# Patient Record
Sex: MALE | Race: WHITE | NOT HISPANIC OR LATINO
[De-identification: names, ages, dates, MRNs, and addresses within clinical notes are randomized per-mention and may not be internally consistent; named-entity substitution may affect disease eponyms.]

---

## 2021-08-27 ENCOUNTER — NON-APPOINTMENT (OUTPATIENT)
Age: 71
End: 2021-08-27

## 2021-10-11 PROBLEM — Z00.00 ENCOUNTER FOR PREVENTIVE HEALTH EXAMINATION: Status: ACTIVE | Noted: 2021-10-11

## 2021-10-13 ENCOUNTER — APPOINTMENT (OUTPATIENT)
Dept: GASTROENTEROLOGY | Facility: CLINIC | Age: 71
End: 2021-10-13

## 2022-01-19 ENCOUNTER — APPOINTMENT (OUTPATIENT)
Dept: GASTROENTEROLOGY | Facility: CLINIC | Age: 72
End: 2022-01-19

## 2022-05-18 ENCOUNTER — APPOINTMENT (OUTPATIENT)
Dept: GASTROENTEROLOGY | Facility: CLINIC | Age: 72
End: 2022-05-18
Payer: MEDICARE

## 2022-05-18 VITALS
HEART RATE: 60 BPM | DIASTOLIC BLOOD PRESSURE: 60 MMHG | HEIGHT: 66 IN | SYSTOLIC BLOOD PRESSURE: 122 MMHG | WEIGHT: 124 LBS | TEMPERATURE: 97 F | BODY MASS INDEX: 19.93 KG/M2

## 2022-05-18 DIAGNOSIS — Z86.010 PERSONAL HISTORY OF COLONIC POLYPS: ICD-10-CM

## 2022-05-18 DIAGNOSIS — R63.4 ABNORMAL WEIGHT LOSS: ICD-10-CM

## 2022-05-18 DIAGNOSIS — C85.90 NON-HODGKIN LYMPHOMA, UNSPECIFIED, UNSPECIFIED SITE: ICD-10-CM

## 2022-05-18 DIAGNOSIS — Z78.9 OTHER SPECIFIED HEALTH STATUS: ICD-10-CM

## 2022-05-18 PROCEDURE — 99204 OFFICE O/P NEW MOD 45 MIN: CPT

## 2022-05-18 RX ORDER — LITHIUM CARBONATE 300 MG/1
300 TABLET ORAL
Refills: 0 | Status: ACTIVE | COMMUNITY

## 2022-05-18 RX ORDER — BACLOFEN 10 MG/1
10 TABLET ORAL 3 TIMES DAILY
Refills: 0 | Status: ACTIVE | COMMUNITY

## 2022-05-18 RX ORDER — CLONAZEPAM 2 MG/1
TABLET ORAL 3 TIMES DAILY
Refills: 0 | Status: ACTIVE | COMMUNITY

## 2022-05-18 RX ORDER — QUETIAPINE 300 MG/1
300 TABLET, FILM COATED ORAL
Refills: 0 | Status: ACTIVE | COMMUNITY

## 2022-05-18 NOTE — ASSESSMENT
[FreeTextEntry1] : -wt loss - hx NHL - will get CT c/a/p.  If neg, then EGD/colon.\par \par -hx polyps - Colon cancer screening - colonoscopy due - will plan w/ EGD after CT done.\par \par PMD/consultation/hospital notes and Labs/imaging/prior endoscopic results reviewed to extent noted in HPI; and, if procedure code billed on this visit for lab draw, this serves to signify that labs were drawn here in this office.\par

## 2022-05-18 NOTE — HISTORY OF PRESENT ILLNESS
[FreeTextEntry1] : 72m NHL, tardive dyskinesia, bipolar d/o, hx polyps, presenting for colon cancer screening. Pt denies abdominal pain, rectal bleeding, change in bowel habits.  He does have 20lb wt loss in last 1-2y.  No abd pain/night sweats/fevers.  Was apparently d/c from heme f/u after long term f/u.  No dysphagia/odynophagia/choking/pna. \par \par Diarrhea w/u 2011 w/ me:\par 6/2011 colonoscopy - diverticulosis, hemorrhoids, nonspec. microscopic infl. change on random bx - repeat 10y; stool studies neg \par 5/2017 colonoscopy - polyps, hemorrhoids, 3y f/u\par \par Soc:  no tobacco or significant EtOH\par FHx: no FHx GI malignancy or IBD\par \par ROS:\par Constitutional:: no weight loss, fevers\par ENT: no deafness\par Eyes: not blind\par Neck: no LN\par Chest: no dyspnea/cough\par Cardiac: no chest pain\par Vascular: no leg swelling\par GI: no abdominal pain, nausea, vomiting, diarrhea, constipation, rectal bleeding, dysphagia, melena unless otherwise noted in HPI\par : no dysuria, dark urine\par Skin: no rashes, jaundice\par Heme: no bleeding\par Endocrine: no DM unless otherwise stated in HPI\par \par Px: (VS noted below)\par General: NAD\par Eyes: anicteric\par Oropharynx:  clear; poor dentition, protruding tongue/baseline.\par Neck: no LN\par Chest: normal respiratory effort\par CVS: regular\par Abd: soft, NT, ND, +BS, no HSM\par Ext: no atrophy\par Neuro: grossly nonfocal\par \par Labs/imaging/prior endoscopic results reviewed to the extent available and noted in HPI\par

## 2022-05-18 NOTE — REASON FOR VISIT
[Consultation] : a consultation visit [FreeTextEntry1] : Kindly asked byDr. Yepez  to consult and evaluate patient for  colon screening, wt loss                  \par A copy of this note is being sent to physician requesting consultation.

## 2022-05-18 NOTE — CONSULT LETTER
[FreeTextEntry1] : Dear Dr. Yepez ,\par \par I had the pleasure of evaluating your patient,  SARAH MA.\par \par Please refer to my note below.\par \par Thank you very much for allowing me to participate in the care of this patient.  If you have any questions, please do not hesitate to contact me.\par \par Sincerely, \par \par Timbo White MD\par

## 2022-07-08 ENCOUNTER — RESULT REVIEW (OUTPATIENT)
Age: 72
End: 2022-07-08

## 2022-07-09 ENCOUNTER — RESULT REVIEW (OUTPATIENT)
Age: 72
End: 2022-07-09

## 2022-07-27 ENCOUNTER — TRANSCRIPTION ENCOUNTER (OUTPATIENT)
Age: 72
End: 2022-07-27

## 2023-06-09 ENCOUNTER — OUTPATIENT (OUTPATIENT)
Dept: OUTPATIENT SERVICES | Facility: HOSPITAL | Age: 73
LOS: 1 days | End: 2023-06-09
Payer: MEDICARE

## 2023-06-09 ENCOUNTER — NON-APPOINTMENT (OUTPATIENT)
Age: 73
End: 2023-06-09

## 2023-06-09 ENCOUNTER — OUTPATIENT (OUTPATIENT)
Dept: OUTPATIENT SERVICES | Facility: HOSPITAL | Age: 73
LOS: 1 days | Discharge: HOME | End: 2023-06-09

## 2023-06-09 ENCOUNTER — APPOINTMENT (OUTPATIENT)
Dept: RADIOLOGY | Facility: HOSPITAL | Age: 73
End: 2023-06-09

## 2023-06-09 DIAGNOSIS — R10.13 EPIGASTRIC PAIN: ICD-10-CM

## 2023-06-09 PROCEDURE — 74230 X-RAY XM SWLNG FUNCJ C+: CPT | Mod: 26

## 2023-06-09 NOTE — ASSESSMENT
[FreeTextEntry1] : MODIFIED BARIUM SWALLOW STUDY \par \par Date of Report: 6/9/23 \par Date of Evaluation: 6/9/23 \par Patient Name: Robby Christie \par YOB: 1950 \par Primary Diagnosis: OroPharyngeal Dysphagia \par Treatment Diagnosis: OroPharyngeal Dysphagia \par Referring Physician: Dr. Lemuel Forbes \par \par Impressions/Results:  \par 1. There is Trace Laryngeal Penetration during the swallow without retrieval leaving Trace residue in the laryngeal vestibule for thin liquids  \par 2. No Aspiration observed before, during or after the swallow for puree, minced and moist solids and/or mildly thick liquids. \par \par Reason For Referral: This 73 year old male was seen for a Modified Barium Swallow to: rule out aspiration and assess for safest diet consistency, to determine patient's ability to safely tolerate an oral diet. The physician ordered this procedure because they want the patient to meet their nutrition/hydration needs by mouth without compromising respiratory status. \par \par Patient arrived to today’s evaluation from [facility]. Patient is able to communicate wants/needs and follows directives. Patient reporting he is having difficulty on his current diet of ground soft/minced and moist texture, characterized by difficulty chewing with patient stating “I only have 4 teeth” (4 bottom dentition noted). Patient states he had top dentures, however, he would “choke” on them, therefore, he does not use them. Patient denies difficulty with liquids. Patient further denies choking episodes with foods/liquids. \par \par Medical History: Patient presents with medical history of the following per [facility] Documentation: \par Schizoaffective Disorder, Bipolar type \par Delirium Due to Known Physiological Condition \par Idiopathic Orofacial Dystonia \par Extrapyramidal and Movement Disorder \par Insomnia \par Acute Kidney Failure \par Dysphagia \par Essential (primary), Hypertension \par Retention of Urine \par Benign Prostatic Hyperplasia with Lower Urinary Tract Symptoms \par Peripheral Vascular Disease  \par \par ASSESSMENT \par The patient was assessed standing in the lateral plane in the [facility]iology Suite, with Radiologist present. The patient was alert, cooperative. Secretion management was adequate. There was no coughing, throat clearing or wet/gurgly vocal quality prior to test administration. \par \par Consistencies Administered: \par Solids: Puree, Minced and Moist solids \par Liquids: Mildly thick liquids and Thin liquids \par \par SUMMARY & IMPRESSION \par The patient demonstrated the following: \par 1. Mild-moderate oral dysphagia for puree, minced and moist solids, mildly thick liquids and thin liquids marked by reduced labial seal closure around teaspoon utensil resulting in reduced retrieval from teaspoon, slow/prolonged gumming/mastication for minced and moist solids with slow/delayed tongue motion for anterior to posterior transfer (minced and moist solids>puree). There is premature spillage to the hypopharynx due to reduced tongue to palate seal for mildly thick and thin liquids. Mild oral residue located primarily on the tongue surface for puree/minced and moist solids that would clear with spontaneous re-collection and re-swallow. \par 2. Mild pharyngeal dysphagia for puree, minced and moist solids, mildly thick liquids and thin liquids marked by a delayed pharyngeal swallow trigger (bolus head at the vallecula for thin liquids) with reduced base of tongue retraction, reduced hyolaryngeal elevation, reduced epiglottic deflection, reduced pharyngeal contractility and incomplete closure of the laryngeal vestibule. There was mild pharyngeal residue post primary swallow located primarily in the pyriform for puree. A thin liquid wash assists with pharyngal clearance. There is Trace Laryngeal Penetration during the swallow without retrieval leaving Trace residue in the laryngeal vestibule for thin liquids. No Aspiration observed before, during or after the swallow for puree, minced and moist solids and/or mildly thick liquids. \par \par Aspiration - Penetration Scale: \par PUREE: 1 \par MINCED AND MOIST SOLIDS: 1 \par MILDLY THICK LIQUIDS: 1 \par THIN LIQUIDS: 3 \par \par Aspiration - Penetration Scale (Lesterbek et al Dysphagia 11:93-98 (April 1996), Aspiration-Penetration Scale) \par 1. Material does not enter the airway \par 2. Material enters the airway, remains above the vocal folds, and is ejected from the airway \par 3. Material enters the airway, remains above the vocal folds, and is not ejected \par 4. Material enters the airway, contacts the vocal folds, and is ejected from the airway \par 5. Material enters the airway, contacts the vocal folds, and is not ejected from the airway \par 6. Material enters the airway, passes below the vocal folds and is ejected into the larynx or out of the airway \par 7. Material enters the airway, passes below the vocal folds, and is not ejected from the trachea despite effort \par 8. Material enters the airway, passes below the vocal folds, and no effort is made to eject \par \par Recommendations: \par 1) Puree with Mildly thick liquids \par 2) Feeding/swallowing guidelines: Upright positioning, Alternate puree and mildly thick liquids \par 3) Aspiration/Reflux precautions \par 4) Consider Swallowing Therapy at MD’s discretion to maximize swallow mechanism \par 5) Follow up with referring physician \par \par The above results and recommendations have been discussed with the patient. All questions were answered with patient demonstrating good understanding. \par  \par Should you have any additional concerns, please contact the Center at [phone]. \par  \par Fouzia Arias MS, CCC-SLP \par Speech-Language Pathologist \par [facility]

## 2023-06-21 DIAGNOSIS — R13.12 DYSPHAGIA, OROPHARYNGEAL PHASE: ICD-10-CM

## 2023-10-13 ENCOUNTER — EMERGENCY (EMERGENCY)
Facility: HOSPITAL | Age: 73
LOS: 1 days | Discharge: HOME | End: 2023-10-13
Attending: EMERGENCY MEDICINE | Admitting: EMERGENCY MEDICINE
Payer: MEDICAID

## 2023-10-13 VITALS
OXYGEN SATURATION: 99 % | RESPIRATION RATE: 18 BRPM | DIASTOLIC BLOOD PRESSURE: 79 MMHG | HEART RATE: 70 BPM | SYSTOLIC BLOOD PRESSURE: 125 MMHG | TEMPERATURE: 98 F

## 2023-10-13 VITALS
DIASTOLIC BLOOD PRESSURE: 84 MMHG | SYSTOLIC BLOOD PRESSURE: 151 MMHG | OXYGEN SATURATION: 99 % | HEART RATE: 87 BPM | TEMPERATURE: 98 F | RESPIRATION RATE: 18 BRPM

## 2023-10-13 PROCEDURE — 99283 EMERGENCY DEPT VISIT LOW MDM: CPT

## 2023-10-13 NOTE — ED ADULT NURSE NOTE - CHIEF COMPLAINT QUOTE
from [facility] for agitation  and aggressive behavior, pt reports felt upset about the news and flipped a table, tearful in triage, phx of schizoaffective disorder, dysphagia, CKD not on dialysis. Pt denies S/I, H/I or A/V/H

## 2023-10-13 NOTE — ED ADULT NURSE NOTE - OBJECTIVE STATEMENT
Pt is alert and orientedx4, ambulatory at baseline. Pt presents to the ED from [facility] for aggressive behavior, Pt was upset about the news today and flipped a table. Pt cooperative and calm, denies SI, HI or any hallucinations. PMHx of dysphagia. Pt denies chest pain, shortness of breath, dyspnea on exertion, breathing is unlabored and even. Pt denies nausea, vomiting or diarrhea.

## 2023-10-13 NOTE — ED PROVIDER NOTE - PATIENT PORTAL LINK FT
You can access the FollowMyHealth Patient Portal offered by [facility] by registering at the following website: [email or web address]. By joining [facility]’s FollowMyHealth portal, you will also be able to view your health information using other applications (apps) compatible with our system.

## 2023-10-13 NOTE — ED PROVIDER NOTE - NSFOLLOWUPINSTRUCTIONS_ED_ALL_ED_FT
Managing Anxiety, Adult  After being diagnosed with anxiety, you may be relieved to know why you have felt or behaved a certain way. You may also feel overwhelmed about the treatment ahead and what it will mean for your life. With care and support, you can manage this condition.    How to manage lifestyle changes  Managing stress and anxiety    An adult doing mindful breathing while practicing yoga.  Stress is your body's reaction to life changes and events, both good and bad. Most stress will last just a few hours, but stress can be ongoing and can lead to more than just stress. Although stress can play a major role in anxiety, it is not the same as anxiety. Stress is usually caused by something external, such as a deadline, test, or competition. Stress normally passes after the triggering event has ended.    Anxiety is caused by something internal, such as imagining a terrible outcome or worrying that something will go wrong that will devastate you. Anxiety often does not go away even after the triggering event is over, and it can become long-term (chronic) worry. It is important to understand the differences between stress and anxiety and to manage your stress effectively so that it does not lead to an anxious response.    Talk with your health care provider or a counselor to learn more about reducing anxiety and stress. He or she may suggest tension reduction techniques, such as:  Music therapy. Spend time creating or listening to music that you enjoy and that inspires you.  Mindfulness-based meditation. Practice being aware of your normal breaths while not trying to control your breathing. It can be done while sitting or walking.  Centering prayer. This involves focusing on a word, phrase, or sacred image that means something to you and brings you peace.  Deep breathing. To do this, expand your stomach and inhale slowly through your nose. Hold your breath for 3–5 seconds. Then exhale slowly, letting your stomach muscles relax.  Self-talk. Learn to notice and identify thought patterns that lead to anxiety reactions and change those patterns to thoughts that feel peaceful.  Muscle relaxation. Taking time to tense muscles and then relax them.  Choose a tension reduction technique that fits your lifestyle and personality. These techniques take time and practice. Set aside 5–15 minutes a day to do them. Therapists can offer counseling and training in these techniques. The training to help with anxiety may be covered by some insurance plans.    Other things you can do to manage stress and anxiety include:  Keeping a stress diary. This can help you learn what triggers your reaction and then learn ways to manage your response.  Thinking about how you react to certain situations. You may not be able to control everything, but you can control your response.  Making time for activities that help you relax and not feeling guilty about spending your time in this way.  Doing visual imagery. This involves imagining or creating mental pictures to help you relax.  Practicing yoga. Through yoga poses, you can lower tension and promote relaxation.  Medicines    Medicines can help ease symptoms. Medicines for anxiety include:  Antidepressant medicines. These are usually prescribed for long-term daily control.  Anti-anxiety medicines. These may be added in severe cases, especially when panic attacks occur.  Medicines will be prescribed by a health care provider. When used together, medicines, psychotherapy, and tension reduction techniques may be the most effective treatment.    Relationships    Relationships can play a big part in helping you recover. Try to spend more time connecting with trusted friends and family members.  Consider going to couples counseling if you have a partner, taking family education classes, or going to family therapy.  Therapy can help you and others better understand your condition.  How to recognize changes in your anxiety  Everyone responds differently to treatment for anxiety. Recovery from anxiety happens when symptoms decrease and stop interfering with your daily activities at home or work. This may mean that you will start to:  Have better concentration and focus. Worry will interfere less in your daily thinking.  Sleep better.  Be less irritable.  Have more energy.  Have improved memory.  It is also important to recognize when your condition is getting worse. Contact your health care provider if your symptoms interfere with home or work and you feel like your condition is not improving.    Follow these instructions at home:  Activity    Exercise. Adults should do the following:  Exercise for at least 150 minutes each week. The exercise should increase your heart rate and make you sweat (moderate-intensity exercise).  Strengthening exercises at least twice a week.  Get the right amount and quality of sleep. Most adults need 7–9 hours of sleep each night.  Lifestyle    Two adults cook together in a kitchen. Fruit and vegetables are on the counter in front of them.  Eat a healthy diet that includes plenty of vegetables, fruits, whole grains, low-fat dairy products, and lean protein.  Do not eat a lot of foods that are high in fats, added sugars, or salt (sodium).  Make choices that simplify your life.  Do not use any products that contain nicotine or tobacco. These products include cigarettes, chewing tobacco, and vaping devices, such as e-cigarettes. If you need help quitting, ask your health care provider.  Avoid caffeine, alcohol, and certain over-the-counter cold medicines. These may make you feel worse. Ask your pharmacist which medicines to avoid.  General instructions    Take over-the-counter and prescription medicines only as told by your health care provider.  Keep all follow-up visits. This is important.  Where to find support  You can get help and support from these sources:  Self-help groups.  Online and community organizations.  A trusted spiritual leader.  Couples counseling.  Family education classes.  Family therapy.  Where to find more information  You may find that joining a support group helps you deal with your anxiety. The following sources can help you locate counselors or support groups near you:  Mental Health [place]: www.mentalhealth[place]ica.net  Anxiety and Depression Association of [place] (ADAA): www.adaa.org  National [place] on Mental Illness (ENID): www.enid.org  Contact a health care provider if:  You have a hard time staying focused or finishing daily tasks.  You spend many hours a day feeling worried about everyday life.  You become exhausted by worry.  You start to have headaches or frequently feel tense.  You develop chronic nausea or diarrhea.  Get help right away if:  You have a racing heart and shortness of breath.  You have thoughts of hurting yourself or others.  If you ever feel like you may hurt yourself or others, or have thoughts about taking your own life, get help right away. Go to your nearest emergency department or:  Call your local emergency services (846 in the [place]  Call a suicide crisis helpline, such as the National Suicide Prevention Lifeline at 1-242.481.2667 or 087 in the [place] This is open 24 hours a day in the [place]  Text the Crisis Text Line at 179448 (in the [place]  Summary  Taking steps to learn and use tension reduction techniques can help calm you and help prevent triggering an anxiety reaction.  When used together, medicines, psychotherapy, and tension reduction techniques may be the most effective treatment.  Family, friends, and partners can play a big part in supporting you.  This information is not intended to replace advice given to you by your health care provider. Make sure you discuss any questions you have with your health care provider.    Document Revised: 07/13/2022 Document Reviewed: 04/10/2

## 2023-10-13 NOTE — ED ADULT NURSE NOTE - NSFALLUNIVINTERV_ED_ALL_ED
Bed/Stretcher in lowest position, wheels locked, appropriate side rails in place/Call bell, personal items and telephone in reach/Instruct patient to call for assistance before getting out of bed/chair/stretcher/Non-slip footwear applied when patient is off stretcher/[place] to call system/Physically safe environment - no spills, clutter or unnecessary equipment/Purposeful proactive rounding/Room/bathroom lighting operational, light cord in reach

## 2023-10-13 NOTE — PROVIDER CONTACT NOTE (OTHER) - ASSESSMENT
Pt is returning back [facility]. Arranged [facility] [phone]. Trip# 320I. P/U 8:30 PM.

## 2023-10-13 NOTE — ED PROVIDER NOTE - CLINICAL SUMMARY MEDICAL DECISION MAKING FREE TEXT BOX
pt with outburst at [facility] home today after getting upset   now at baseline  called the [facility] and they report he is usually very calm and today was very unusual    agrees he can return to the [facility] home.  will arrange transport.

## 2023-10-13 NOTE — ED ADULT TRIAGE NOTE - CHIEF COMPLAINT QUOTE
from [facility] for agitation  and aggressive behavior, pt reports felt upset about the news and flipped a table, tearful in triage, phx of schizoaffective disorder, dysphagia, CKD not on dialysis. Pt denies S/I, H/I or A/V/H

## 2023-10-13 NOTE — ED PROVIDER NOTE - OBJECTIVE STATEMENT
pt sent to ER from [facility] home because he became agitated today.  pt now calm, co-operative, walking without assistance easily redirectable ..

## 2023-10-18 ENCOUNTER — INPATIENT (INPATIENT)
Facility: HOSPITAL | Age: 73
LOS: 5 days | Discharge: NURSING, REHAB OR LONG-TERM CARE | End: 2023-10-24
Attending: INTERNAL MEDICINE | Admitting: INTERNAL MEDICINE
Payer: MEDICAID

## 2023-10-18 VITALS
HEART RATE: 72 BPM | DIASTOLIC BLOOD PRESSURE: 109 MMHG | OXYGEN SATURATION: 98 % | SYSTOLIC BLOOD PRESSURE: 132 MMHG | TEMPERATURE: 97 F | RESPIRATION RATE: 20 BRPM

## 2023-10-18 DIAGNOSIS — E87.8 OTHER DISORDERS OF ELECTROLYTE AND FLUID BALANCE, NOT ELSEWHERE CLASSIFIED: ICD-10-CM

## 2023-10-18 DIAGNOSIS — N39.0 URINARY TRACT INFECTION, SITE NOT SPECIFIED: ICD-10-CM

## 2023-10-18 DIAGNOSIS — G93.41 METABOLIC ENCEPHALOPATHY: ICD-10-CM

## 2023-10-18 DIAGNOSIS — G25.9 EXTRAPYRAMIDAL AND MOVEMENT DISORDER, UNSPECIFIED: ICD-10-CM

## 2023-10-18 DIAGNOSIS — F25.9 SCHIZOAFFECTIVE DISORDER, UNSPECIFIED: ICD-10-CM

## 2023-10-18 DIAGNOSIS — I10 ESSENTIAL (PRIMARY) HYPERTENSION: ICD-10-CM

## 2023-10-18 PROBLEM — Z87.438 PERSONAL HISTORY OF OTHER DISEASES OF MALE GENITAL ORGANS: Chronic | Status: ACTIVE | Noted: 2023-10-13

## 2023-10-18 LAB
ALBUMIN SERPL ELPH-MCNC: 4.3 G/DL — SIGNIFICANT CHANGE UP (ref 3.3–5)
ALBUMIN SERPL ELPH-MCNC: 4.3 G/DL — SIGNIFICANT CHANGE UP (ref 3.3–5)
ALP SERPL-CCNC: 56 U/L — SIGNIFICANT CHANGE UP (ref 40–120)
ALP SERPL-CCNC: 56 U/L — SIGNIFICANT CHANGE UP (ref 40–120)
ALT FLD-CCNC: 29 U/L — SIGNIFICANT CHANGE UP (ref 4–41)
ALT FLD-CCNC: 29 U/L — SIGNIFICANT CHANGE UP (ref 4–41)
ANION GAP SERPL CALC-SCNC: 12 MMOL/L — SIGNIFICANT CHANGE UP (ref 7–14)
ANION GAP SERPL CALC-SCNC: 12 MMOL/L — SIGNIFICANT CHANGE UP (ref 7–14)
APAP SERPL-MCNC: <10 UG/ML — LOW (ref 15–25)
APAP SERPL-MCNC: <10 UG/ML — LOW (ref 15–25)
APPEARANCE UR: ABNORMAL
APPEARANCE UR: ABNORMAL
AST SERPL-CCNC: 32 U/L — SIGNIFICANT CHANGE UP (ref 4–40)
AST SERPL-CCNC: 32 U/L — SIGNIFICANT CHANGE UP (ref 4–40)
BACTERIA # UR AUTO: ABNORMAL /HPF
BACTERIA # UR AUTO: ABNORMAL /HPF
BASOPHILS # BLD AUTO: 0.02 K/UL — SIGNIFICANT CHANGE UP (ref 0–0.2)
BASOPHILS # BLD AUTO: 0.02 K/UL — SIGNIFICANT CHANGE UP (ref 0–0.2)
BASOPHILS NFR BLD AUTO: 0.5 % — SIGNIFICANT CHANGE UP (ref 0–2)
BASOPHILS NFR BLD AUTO: 0.5 % — SIGNIFICANT CHANGE UP (ref 0–2)
BILIRUB DIRECT SERPL-MCNC: <0.2 MG/DL — SIGNIFICANT CHANGE UP (ref 0–0.3)
BILIRUB DIRECT SERPL-MCNC: <0.2 MG/DL — SIGNIFICANT CHANGE UP (ref 0–0.3)
BILIRUB INDIRECT FLD-MCNC: >0 MG/DL — SIGNIFICANT CHANGE UP (ref 0–1)
BILIRUB INDIRECT FLD-MCNC: >0 MG/DL — SIGNIFICANT CHANGE UP (ref 0–1)
BILIRUB SERPL-MCNC: 0.2 MG/DL — SIGNIFICANT CHANGE UP (ref 0.2–1.2)
BILIRUB SERPL-MCNC: 0.2 MG/DL — SIGNIFICANT CHANGE UP (ref 0.2–1.2)
BILIRUB UR-MCNC: NEGATIVE — SIGNIFICANT CHANGE UP
BILIRUB UR-MCNC: NEGATIVE — SIGNIFICANT CHANGE UP
BUN SERPL-MCNC: 26 MG/DL — HIGH (ref 7–23)
BUN SERPL-MCNC: 26 MG/DL — HIGH (ref 7–23)
CALCIUM SERPL-MCNC: 9.3 MG/DL — SIGNIFICANT CHANGE UP (ref 8.4–10.5)
CALCIUM SERPL-MCNC: 9.3 MG/DL — SIGNIFICANT CHANGE UP (ref 8.4–10.5)
CHLORIDE SERPL-SCNC: 103 MMOL/L — SIGNIFICANT CHANGE UP (ref 98–107)
CHLORIDE SERPL-SCNC: 103 MMOL/L — SIGNIFICANT CHANGE UP (ref 98–107)
CO2 SERPL-SCNC: 27 MMOL/L — SIGNIFICANT CHANGE UP (ref 22–31)
CO2 SERPL-SCNC: 27 MMOL/L — SIGNIFICANT CHANGE UP (ref 22–31)
COLOR SPEC: YELLOW — SIGNIFICANT CHANGE UP
COLOR SPEC: YELLOW — SIGNIFICANT CHANGE UP
CREAT SERPL-MCNC: 0.85 MG/DL — SIGNIFICANT CHANGE UP (ref 0.5–1.3)
CREAT SERPL-MCNC: 0.85 MG/DL — SIGNIFICANT CHANGE UP (ref 0.5–1.3)
DIFF PNL FLD: ABNORMAL
DIFF PNL FLD: ABNORMAL
EGFR: 92 ML/MIN/1.73M2 — SIGNIFICANT CHANGE UP
EGFR: 92 ML/MIN/1.73M2 — SIGNIFICANT CHANGE UP
EOSINOPHIL # BLD AUTO: 0.01 K/UL — SIGNIFICANT CHANGE UP (ref 0–0.5)
EOSINOPHIL # BLD AUTO: 0.01 K/UL — SIGNIFICANT CHANGE UP (ref 0–0.5)
EOSINOPHIL NFR BLD AUTO: 0.2 % — SIGNIFICANT CHANGE UP (ref 0–6)
EOSINOPHIL NFR BLD AUTO: 0.2 % — SIGNIFICANT CHANGE UP (ref 0–6)
ETHANOL SERPL-MCNC: <10 MG/DL — SIGNIFICANT CHANGE UP
ETHANOL SERPL-MCNC: <10 MG/DL — SIGNIFICANT CHANGE UP
GLUCOSE SERPL-MCNC: 129 MG/DL — HIGH (ref 70–99)
GLUCOSE SERPL-MCNC: 129 MG/DL — HIGH (ref 70–99)
GLUCOSE UR QL: NEGATIVE MG/DL — SIGNIFICANT CHANGE UP
GLUCOSE UR QL: NEGATIVE MG/DL — SIGNIFICANT CHANGE UP
HCT VFR BLD CALC: 38 % — LOW (ref 39–50)
HCT VFR BLD CALC: 38 % — LOW (ref 39–50)
HGB BLD-MCNC: 12.5 G/DL — LOW (ref 13–17)
HGB BLD-MCNC: 12.5 G/DL — LOW (ref 13–17)
IANC: 2.57 K/UL — SIGNIFICANT CHANGE UP (ref 1.8–7.4)
IANC: 2.57 K/UL — SIGNIFICANT CHANGE UP (ref 1.8–7.4)
IMM GRANULOCYTES NFR BLD AUTO: 1 % — HIGH (ref 0–0.9)
IMM GRANULOCYTES NFR BLD AUTO: 1 % — HIGH (ref 0–0.9)
KETONES UR-MCNC: NEGATIVE MG/DL — SIGNIFICANT CHANGE UP
KETONES UR-MCNC: NEGATIVE MG/DL — SIGNIFICANT CHANGE UP
LEUKOCYTE ESTERASE UR-ACNC: ABNORMAL
LEUKOCYTE ESTERASE UR-ACNC: ABNORMAL
LYMPHOCYTES # BLD AUTO: 0.83 K/UL — LOW (ref 1–3.3)
LYMPHOCYTES # BLD AUTO: 0.83 K/UL — LOW (ref 1–3.3)
LYMPHOCYTES # BLD AUTO: 20.7 % — SIGNIFICANT CHANGE UP (ref 13–44)
LYMPHOCYTES # BLD AUTO: 20.7 % — SIGNIFICANT CHANGE UP (ref 13–44)
MAGNESIUM SERPL-MCNC: 1.9 MG/DL — SIGNIFICANT CHANGE UP (ref 1.6–2.6)
MAGNESIUM SERPL-MCNC: 1.9 MG/DL — SIGNIFICANT CHANGE UP (ref 1.6–2.6)
MCHC RBC-ENTMCNC: 31.7 PG — SIGNIFICANT CHANGE UP (ref 27–34)
MCHC RBC-ENTMCNC: 31.7 PG — SIGNIFICANT CHANGE UP (ref 27–34)
MCHC RBC-ENTMCNC: 32.9 GM/DL — SIGNIFICANT CHANGE UP (ref 32–36)
MCHC RBC-ENTMCNC: 32.9 GM/DL — SIGNIFICANT CHANGE UP (ref 32–36)
MCV RBC AUTO: 96.4 FL — SIGNIFICANT CHANGE UP (ref 80–100)
MCV RBC AUTO: 96.4 FL — SIGNIFICANT CHANGE UP (ref 80–100)
MONOCYTES # BLD AUTO: 0.54 K/UL — SIGNIFICANT CHANGE UP (ref 0–0.9)
MONOCYTES # BLD AUTO: 0.54 K/UL — SIGNIFICANT CHANGE UP (ref 0–0.9)
MONOCYTES NFR BLD AUTO: 13.5 % — SIGNIFICANT CHANGE UP (ref 2–14)
MONOCYTES NFR BLD AUTO: 13.5 % — SIGNIFICANT CHANGE UP (ref 2–14)
NEUTROPHILS # BLD AUTO: 2.57 K/UL — SIGNIFICANT CHANGE UP (ref 1.8–7.4)
NEUTROPHILS # BLD AUTO: 2.57 K/UL — SIGNIFICANT CHANGE UP (ref 1.8–7.4)
NEUTROPHILS NFR BLD AUTO: 64.1 % — SIGNIFICANT CHANGE UP (ref 43–77)
NEUTROPHILS NFR BLD AUTO: 64.1 % — SIGNIFICANT CHANGE UP (ref 43–77)
NITRITE UR-MCNC: POSITIVE
NITRITE UR-MCNC: POSITIVE
NRBC # BLD: 0 /100 WBCS — SIGNIFICANT CHANGE UP (ref 0–0)
NRBC # BLD: 0 /100 WBCS — SIGNIFICANT CHANGE UP (ref 0–0)
NRBC # FLD: 0 K/UL — SIGNIFICANT CHANGE UP (ref 0–0)
NRBC # FLD: 0 K/UL — SIGNIFICANT CHANGE UP (ref 0–0)
PH UR: 6.5 — SIGNIFICANT CHANGE UP (ref 5–8)
PH UR: 6.5 — SIGNIFICANT CHANGE UP (ref 5–8)
PLATELET # BLD AUTO: 114 K/UL — LOW (ref 150–400)
PLATELET # BLD AUTO: 114 K/UL — LOW (ref 150–400)
POTASSIUM SERPL-MCNC: 3 MMOL/L — LOW (ref 3.5–5.3)
POTASSIUM SERPL-MCNC: 3 MMOL/L — LOW (ref 3.5–5.3)
POTASSIUM SERPL-SCNC: 3 MMOL/L — LOW (ref 3.5–5.3)
POTASSIUM SERPL-SCNC: 3 MMOL/L — LOW (ref 3.5–5.3)
PROT SERPL-MCNC: 6.7 G/DL — SIGNIFICANT CHANGE UP (ref 6–8.3)
PROT SERPL-MCNC: 6.7 G/DL — SIGNIFICANT CHANGE UP (ref 6–8.3)
PROT UR-MCNC: SIGNIFICANT CHANGE UP MG/DL
PROT UR-MCNC: SIGNIFICANT CHANGE UP MG/DL
RBC # BLD: 3.94 M/UL — LOW (ref 4.2–5.8)
RBC # BLD: 3.94 M/UL — LOW (ref 4.2–5.8)
RBC # FLD: 12.9 % — SIGNIFICANT CHANGE UP (ref 10.3–14.5)
RBC # FLD: 12.9 % — SIGNIFICANT CHANGE UP (ref 10.3–14.5)
RBC CASTS # UR COMP ASSIST: 1 /HPF — SIGNIFICANT CHANGE UP (ref 0–4)
RBC CASTS # UR COMP ASSIST: 1 /HPF — SIGNIFICANT CHANGE UP (ref 0–4)
SALICYLATES SERPL-MCNC: <0.3 MG/DL — LOW (ref 15–30)
SALICYLATES SERPL-MCNC: <0.3 MG/DL — LOW (ref 15–30)
SARS-COV-2 RNA SPEC QL NAA+PROBE: SIGNIFICANT CHANGE UP
SARS-COV-2 RNA SPEC QL NAA+PROBE: SIGNIFICANT CHANGE UP
SODIUM SERPL-SCNC: 142 MMOL/L — SIGNIFICANT CHANGE UP (ref 135–145)
SODIUM SERPL-SCNC: 142 MMOL/L — SIGNIFICANT CHANGE UP (ref 135–145)
SP GR SPEC: 1.01 — SIGNIFICANT CHANGE UP (ref 1–1.03)
SP GR SPEC: 1.01 — SIGNIFICANT CHANGE UP (ref 1–1.03)
SQUAMOUS # UR AUTO: 0 /HPF — SIGNIFICANT CHANGE UP (ref 0–5)
SQUAMOUS # UR AUTO: 0 /HPF — SIGNIFICANT CHANGE UP (ref 0–5)
TSH SERPL-MCNC: 0.78 UIU/ML — SIGNIFICANT CHANGE UP (ref 0.27–4.2)
TSH SERPL-MCNC: 0.78 UIU/ML — SIGNIFICANT CHANGE UP (ref 0.27–4.2)
UROBILINOGEN FLD QL: 1 MG/DL — SIGNIFICANT CHANGE UP (ref 0.2–1)
UROBILINOGEN FLD QL: 1 MG/DL — SIGNIFICANT CHANGE UP (ref 0.2–1)
VALPROATE SERPL-MCNC: 70.9 UG/ML — SIGNIFICANT CHANGE UP (ref 50–100)
VALPROATE SERPL-MCNC: 70.9 UG/ML — SIGNIFICANT CHANGE UP (ref 50–100)
WBC # BLD: 4.01 K/UL — SIGNIFICANT CHANGE UP (ref 3.8–10.5)
WBC # BLD: 4.01 K/UL — SIGNIFICANT CHANGE UP (ref 3.8–10.5)
WBC # FLD AUTO: 4.01 K/UL — SIGNIFICANT CHANGE UP (ref 3.8–10.5)
WBC # FLD AUTO: 4.01 K/UL — SIGNIFICANT CHANGE UP (ref 3.8–10.5)
WBC UR QL: >50 /HPF — SIGNIFICANT CHANGE UP (ref 0–5)
WBC UR QL: >50 /HPF — SIGNIFICANT CHANGE UP (ref 0–5)

## 2023-10-18 PROCEDURE — 99285 EMERGENCY DEPT VISIT HI MDM: CPT | Mod: FS

## 2023-10-18 PROCEDURE — 99223 1ST HOSP IP/OBS HIGH 75: CPT

## 2023-10-18 RX ORDER — ENOXAPARIN SODIUM 100 MG/ML
40 INJECTION SUBCUTANEOUS EVERY 24 HOURS
Refills: 0 | Status: DISCONTINUED | OUTPATIENT
Start: 2023-10-18 | End: 2023-10-24

## 2023-10-18 RX ORDER — TAMSULOSIN HYDROCHLORIDE 0.4 MG/1
0.8 CAPSULE ORAL AT BEDTIME
Refills: 0 | Status: DISCONTINUED | OUTPATIENT
Start: 2023-10-18 | End: 2023-10-24

## 2023-10-18 RX ORDER — BENZTROPINE MESYLATE 1 MG
0.5 TABLET ORAL
Refills: 0 | Status: DISCONTINUED | OUTPATIENT
Start: 2023-10-18 | End: 2023-10-24

## 2023-10-18 RX ORDER — TAMSULOSIN HYDROCHLORIDE 0.4 MG/1
2 CAPSULE ORAL
Refills: 0 | DISCHARGE

## 2023-10-18 RX ORDER — QUETIAPINE FUMARATE 200 MG/1
1 TABLET, FILM COATED ORAL
Refills: 0 | DISCHARGE

## 2023-10-18 RX ORDER — ACETAMINOPHEN 500 MG
650 TABLET ORAL EVERY 6 HOURS
Refills: 0 | Status: DISCONTINUED | OUTPATIENT
Start: 2023-10-18 | End: 2023-10-24

## 2023-10-18 RX ORDER — CEFTRIAXONE 500 MG/1
1000 INJECTION, POWDER, FOR SOLUTION INTRAMUSCULAR; INTRAVENOUS EVERY 24 HOURS
Refills: 0 | Status: COMPLETED | OUTPATIENT
Start: 2023-10-19 | End: 2023-10-21

## 2023-10-18 RX ORDER — ONDANSETRON 8 MG/1
4 TABLET, FILM COATED ORAL EVERY 8 HOURS
Refills: 0 | Status: DISCONTINUED | OUTPATIENT
Start: 2023-10-18 | End: 2023-10-24

## 2023-10-18 RX ORDER — DIVALPROEX SODIUM 500 MG/1
500 TABLET, DELAYED RELEASE ORAL
Refills: 0 | Status: DISCONTINUED | OUTPATIENT
Start: 2023-10-18 | End: 2023-10-24

## 2023-10-18 RX ORDER — CEFTRIAXONE 500 MG/1
1000 INJECTION, POWDER, FOR SOLUTION INTRAMUSCULAR; INTRAVENOUS ONCE
Refills: 0 | Status: COMPLETED | OUTPATIENT
Start: 2023-10-18 | End: 2023-10-18

## 2023-10-18 RX ORDER — LANOLIN ALCOHOL/MO/W.PET/CERES
3 CREAM (GRAM) TOPICAL AT BEDTIME
Refills: 0 | Status: DISCONTINUED | OUTPATIENT
Start: 2023-10-18 | End: 2023-10-24

## 2023-10-18 RX ORDER — QUETIAPINE FUMARATE 200 MG/1
400 TABLET, FILM COATED ORAL AT BEDTIME
Refills: 0 | Status: DISCONTINUED | OUTPATIENT
Start: 2023-10-18 | End: 2023-10-24

## 2023-10-18 RX ORDER — LANOLIN ALCOHOL/MO/W.PET/CERES
1 CREAM (GRAM) TOPICAL AT BEDTIME
Refills: 0 | Status: DISCONTINUED | OUTPATIENT
Start: 2023-10-18 | End: 2023-10-18

## 2023-10-18 RX ORDER — POTASSIUM CHLORIDE 20 MEQ
10 PACKET (EA) ORAL
Refills: 0 | Status: COMPLETED | OUTPATIENT
Start: 2023-10-18 | End: 2023-10-18

## 2023-10-18 RX ORDER — OLANZAPINE 15 MG/1
5 TABLET, FILM COATED ORAL ONCE
Refills: 0 | Status: COMPLETED | OUTPATIENT
Start: 2023-10-18 | End: 2023-10-18

## 2023-10-18 RX ORDER — LANOLIN ALCOHOL/MO/W.PET/CERES
1 CREAM (GRAM) TOPICAL
Refills: 0 | DISCHARGE

## 2023-10-18 RX ORDER — DIVALPROEX SODIUM 500 MG/1
1 TABLET, DELAYED RELEASE ORAL
Refills: 0 | DISCHARGE

## 2023-10-18 RX ORDER — BENZTROPINE MESYLATE 1 MG
1 TABLET ORAL
Refills: 0 | DISCHARGE

## 2023-10-18 RX ORDER — CEFPODOXIME PROXETIL 100 MG
200 TABLET ORAL EVERY 12 HOURS
Refills: 0 | Status: DISCONTINUED | OUTPATIENT
Start: 2023-10-18 | End: 2023-10-18

## 2023-10-18 RX ADMIN — QUETIAPINE FUMARATE 400 MILLIGRAM(S): 200 TABLET, FILM COATED ORAL at 22:29

## 2023-10-18 RX ADMIN — Medication 0.5 MILLIGRAM(S): at 22:29

## 2023-10-18 RX ADMIN — ENOXAPARIN SODIUM 40 MILLIGRAM(S): 100 INJECTION SUBCUTANEOUS at 22:30

## 2023-10-18 RX ADMIN — TAMSULOSIN HYDROCHLORIDE 0.8 MILLIGRAM(S): 0.4 CAPSULE ORAL at 22:28

## 2023-10-18 RX ADMIN — OLANZAPINE 5 MILLIGRAM(S): 15 TABLET, FILM COATED ORAL at 18:42

## 2023-10-18 RX ADMIN — DIVALPROEX SODIUM 500 MILLIGRAM(S): 500 TABLET, DELAYED RELEASE ORAL at 22:29

## 2023-10-18 RX ADMIN — CEFTRIAXONE 100 MILLIGRAM(S): 500 INJECTION, POWDER, FOR SOLUTION INTRAMUSCULAR; INTRAVENOUS at 18:42

## 2023-10-18 NOTE — H&P ADULT - PROBLEM SELECTOR PLAN 2
New  UA: Nitrite +, LE: large, WBC >50, Bacterial occasional  Attempted to get culture hx from [facility] but they were unable to provide me with that information   Ucx pending   Continue rocephin

## 2023-10-18 NOTE — ED ADULT NURSE REASSESSMENT NOTE - NS ED NURSE REASSESS COMMENT FT1
report given to YAMILETH Hernandez, 20G placed to the left forearm, pt medicated as per provider orders.

## 2023-10-18 NOTE — PATIENT PROFILE ADULT - TRANSPORTATION
Subjective:      Patient ID: Magnolia Diehl is a 81 y.o. male who presents today for:  Chief Complaint   Patient presents with    6 Month Follow-Up     Pt states that the [employer] Expose isn't covered by the insurance and he doesn't want to pay for it as he states he isn't seeing a change while hes been on it. Pt would like to discuss a different medication option. Pt states that things are about the same he has good days an bad days. Pts wife says he needs to go back to therapy and while she was in [place] she tried to schedule therapy but they said they would get in contact with you then reach out to her but they never did. HPI 41-year-old right-handed gentleman with history of Parkinson's disease. Patient also has autonomic dysfunction with low blood pressures. Since laceration we will start above droxidopa he was on 3 times a day doing very well though he no longer can afford this is $700. He discontinued this. His blood pressures are better his systolic pressure is 340 today is not orthostatic at least clinically. Patient is only on carbidopa levodopa 3 times a day we were not able to increase this. He was sent in [place] and requires physical therapy. We recommended continuation of stockings as well  She has not any choking. He is rarely dizzy now.   His walking is not change his cognition has not changed    Past Medical History:   Diagnosis Date    Actinic keratoses     Aortic valve stenosis, severe 9/20/2013    BPH (benign prostatic hyperplasia)     Cancer ([facility] [address] 04/2018    skin cancer on chest removed    Colon polyp 38/09/5634    Diastolic dysfunction 86/10/4387    Stage 2 by echo    ED (erectile dysfunction) 11/29/2011    Glaucoma     Dr. Walker Mariee Hemochromatosis     History of echocardiogram 02/18/2015    mild AS, mild AR, mild TR, mild MR    [place]    Hypertension     Iron deficiency anemia 4/28/2014    LVH (left ventricular hypertrophy) 9/20/2013    Macular degeneration     bilateral; dry on left and wet on right-Dr. Arabella Kellogg on back 5/14/2015    Osteoarthritis cervical spine     Parkinson disease (HCC)     Dr. Elle Forte     Past Surgical History:   Procedure Laterality Date    BACK SURGERY      CARDIAC CATHETERIZATION  11/09/2017    [facility] MD    CATARACT REMOVAL      COLONOSCOPY  11/2009    single polyp right colon    EYE SURGERY Bilateral     OTHER SURGICAL HISTORY  06/08/15    EXC SUBCU MASS BACK    TONSILLECTOMY       Social History     Socioeconomic History    Marital status:      Spouse name: Not on file    Number of children: 3    Years of education: Not on file    Highest education level: Not on file   Occupational History    Occupation: retired     Employer: [employer]     Comment: management   Social Needs    Financial resource strain: Not hard at all   [employer] insecurity     Worry: Never true     Inability: Never true    Transportation needs     Medical: No     Non-medical: No   Tobacco Use    Smoking status: Never Smoker    Smokeless tobacco: Never Used   Substance and Sexual Activity    Alcohol use: Yes     Comment: rare    Drug use: No    Sexual activity: Not Currently     Partners: Female   Lifestyle    Physical activity     Days per week: 3 days     Minutes per session: 20 min    Stress: Only a little   Relationships    Social connections     Talks on phone: More than three times a week     Gets together: Once a week     Attends Oriental orthodox service: 1 to 4 times per year     Active member of club or organization: No     Attends meetings of clubs or organizations: Never     Relationship status:     Intimate partner violence     Fear of current or ex partner: No     Emotionally abused: No     Physically abused: No     Forced sexual activity: No   Other Topics Concern    Not on file   Social History Narrative    Lives with wife. She is in good health and she still drives.     He is retired. He has 3 children all of which live out of the area one in [place] 1 in [address] [place] [address] and one in the [place] area. 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 Pt reports high level of indep prior to admission. No family history on file. Allergies   Allergen Reactions    Cat Hair Extract Itching and Swelling     cats       Current Outpatient Medications   Medication Sig Dispense Refill    finasteride (PROSCAR) 5 MG tablet Take 1 tablet by mouth daily 90 tablet 3    Droxidopa (NORTHERA) 100 MG CAPS Take 1 capsule by mouth 3 times daily 90 capsule 3    oxybutynin (DITROPAN XL) 5 MG extended release tablet Take 1 tablet by mouth daily 90 tablet 3    finasteride (PROSCAR) 5 MG tablet Take 1 tablet by mouth daily 30 tablet 3    Cholecalciferol (VITAMIN D3) 2000 units CAPS Take 1,000 Units by mouth daily      vitamin B-12 (CYANOCOBALAMIN) 1000 MCG tablet Take 1,000 mcg by mouth daily       Dorzolamide HCl-Timolol Mal (COSOPT OP) Apply 1 drop to eye 2 times daily      folic acid (FOLVITE) 720 MCG tablet Take 1 tablet by mouth daily       carbidopa-levodopa (SINEMET)  MG per tablet Take 1 tablet by mouth 3 times daily      bimatoprost (LUMIGAN) 0.03 % ophthalmic drops Place 1 drop into the left eye nightly.         Droxidopa (NORTHERA) 100 MG CAPS Take 1 capsule by mouth 3 times daily 3 SAMPLES GIVEN  2) LOT: CBYBW  EXP: 7/22  1) LOT: 200 [facility] 90 capsule 0    lactobacillus acidophilus [facility]) Take 4 tablets by mouth 3 times daily      lactobacillus acidophilus [facility]) Take 2 tablets by mouth 3 times daily (Patient not taking: Reported on 5/5/2021) 120 tablet 1     No current facility-administered medications for this visit. Review of Systems   Constitutional: Negative for fever. HENT: Negative for ear pain, tinnitus and trouble swallowing. Eyes: Negative for photophobia and visual disturbance. Respiratory: Negative for choking and shortness of breath. Cardiovascular: Negative for chest pain and palpitations. Gastrointestinal: Negative for nausea and vomiting. Musculoskeletal: Positive for gait problem. Negative for back pain, joint swelling, myalgias, neck pain and neck stiffness. Skin: Negative for color change. Allergic/Immunologic: Negative for food allergies. Neurological: Positive for tremors and weakness. Negative for dizziness, seizures, syncope, facial asymmetry, speech difficulty, light-headedness, numbness and headaches. Psychiatric/Behavioral: Negative for behavioral problems, confusion, hallucinations and sleep disturbance. Objective:   /78 (Site: Left Upper Arm, Position: Sitting, Cuff Size: Medium Adult)   Pulse 75   Wt 156 lb 12.8 oz (71.1 kg)   SpO2 99%   BMI 21.27 kg/m²     Physical Exam  Vitals signs reviewed. Eyes:      Pupils: Pupils are equal, round, and reactive to light. Neck:      Musculoskeletal: Normal range of motion. Cardiovascular:      Rate and Rhythm: Normal rate and regular rhythm. Heart sounds: No murmur. Pulmonary:      Effort: Pulmonary effort is normal.      Breath sounds: Normal breath sounds. Abdominal:      General: Bowel sounds are normal.   Musculoskeletal: Normal range of motion. Skin:     General: Skin is warm.    Neurological:      Mental Status: He is alert and oriented to person, place, and time. Cranial Nerves: No cranial nerve deficit. Sensory: No sensory deficit. Motor: No abnormal muscle tone. Coordination: Coordination normal.      Deep Tendon Reflexes: Reflexes are normal and symmetric. Babinski sign absent on the right side. Babinski sign absent on the left side. Comments: Patient has a mild shuffling gait with decreased arm swings. No tremors are notable. Psychiatric:         Mood and Affect: Mood normal.         No results found.     Lab Results   Component Value Date    WBC 4.5 10/14/2020    RBC 3.42 10/14/2020    RBC 3.79 12/01/2011    HGB 11.8 10/14/2020    HCT 35.5 10/14/2020    .7 10/14/2020    MCH 34.5 10/14/2020    MCHC 33.3 10/14/2020    RDW 19.0 10/14/2020     10/14/2020    MPV 8.1 07/06/2015     Lab Results   Component Value Date     10/14/2020    K 4.1 10/14/2020    K 3.6 07/01/2020     10/14/2020    CO2 24 10/14/2020    BUN 13 10/14/2020    CREATININE 0.66 10/14/2020    GFRAA >60.0 10/14/2020    LABGLOM >60.0 10/14/2020    GLUCOSE 87 10/14/2020    GLUCOSE 81 12/01/2011    PROT 6.1 06/29/2020    LABALBU 3.7 06/29/2020    LABALBU 4.5 12/01/2011    CALCIUM 8.5 10/14/2020    BILITOT 0.5 06/29/2020    ALKPHOS 37 06/29/2020    AST 13 10/14/2020    ALT <5 10/14/2020     Lab Results   Component Value Date    PROTIME 13.0 06/30/2020    INR 1.0 06/30/2020     Lab Results   Component Value Date    TSH 3.080 06/30/2020    PYGVNYAZ93 449 10/14/2020    FOLATE 5.4 10/14/2020    FERRITIN 842.5 09/10/2018    IRON 154 11/13/2019    TIBC 318 11/13/2019     Lab Results   Component Value Date    TRIG 45 11/13/2019     11/13/2019    LDLCALC 74 11/13/2019     Lab Results   Component Value Date    LABAMPH Neg 07/14/2017    BARBSCNU Neg 07/14/2017    LABBENZ Neg 07/14/2017    OPIATESCREENURINE Neg 07/14/2017    PHENCYCLIDINESCREENURINE Neg 07/14/2017    ETOH 137 10/15/2019     No results found for: LITHIUM, DOLORESFRTOT, VALPROATE    Assessment:       Diagnosis Orders   1. PD (Parkinson's disease) [facility])  Ambulatory referral to Physical Therapy   2. Syncope and collapse     3. Primary orthostatic hypotension     4. Abnormality of gait and mobility     Parkinson's disease which is optimally treated. Patient is on carbidopa levodopa 3 times a day. We were not able to increase his further due to hypotension. Patient blood pressure appears to better is no longer Northera due to the cost.  He does not require this if his blood pressures remain above 110. He will monitor this. In the event that he does have decreased blood pressures we may have to consider again midodrine. Nelta [place] would be an option though it is expensive for him. Since last seen patient is not any falls injuries trauma no cognitive states no dyskinesias hallucinations. Patient will be referred for physical therapy as this will help his gait. We will continue keep observation following. Plan:      Orders Placed This Encounter   Procedures    Ambulatory referral to Physical Therapy     Referral Priority:   Routine     Referral Type:   Eval and Treat     Referral Reason:   Specialty Services Required     Requested Specialty:   Physical Therapy     Number of Visits Requested:   1     No orders of the defined types were placed in this encounter. No follow-ups on file.       Luis Saavedar MD no

## 2023-10-18 NOTE — H&P ADULT - NSICDXPASTMEDICALHX_GEN_ALL_CORE_FT
PAST MEDICAL HISTORY:  Extrapyramidal disorder     History of BPH     History of dysphagia     HTN (hypertension)     Schizoaffective disorder

## 2023-10-18 NOTE — PATIENT PROFILE ADULT - FALL HARM RISK - HARM RISK INTERVENTIONS
Assistance OOB with selected safe patient handling equipment/Communicate Risk of Fall with Harm to all staff/Monitor for mental status changes/Move patient closer to nurses' station/Reinforce activity limits and safety measures with patient and family/Reorient to person, place and time as needed/Tailored Fall Risk Interventions/Toileting schedule using arm’s reach rule for commode and bathroom/Use of alarms - bed, chair and/or voice tab/Visual Cue: Yellow wristband and red socks/Bed in lowest position, wheels locked, appropriate side rails in place/Call bell, personal items and telephone in reach/Instruct patient to call for assistance before getting out of bed or chair/Non-slip footwear when patient is out of bed/[place] to call system/Physically safe environment - no spills, clutter or unnecessary equipment/Purposeful Proactive Rounding/Room/bathroom lighting operational, light cord in reach

## 2023-10-18 NOTE — H&P ADULT - PROBLEM SELECTOR PLAN 4
Chronic unstable  Likely change in behavior 2/2 UTI  Continue to monitor and consult Psych if required  Continue divalproex 500mg BID (valproic acid level 70.90)

## 2023-10-18 NOTE — H&P ADULT - HISTORY OF PRESENT ILLNESS
73 yr old male with a pmh of HTN, schizoaffective disorder, EPS, BPH, PVD who presents with change in his behavior over the past week.   Pt himself reports that the NH "buys adult movies which make me masturbate". He also reports he does not like the food there and "the staff are stupid" per ED note. Also per ED note "Of note they state that they do usually have a psychiatrist there however psychiatrist has been on vacation therefore they sent him here for evaluation."  Pt endorses intermittent dizziness upon standing.  Denies  headache, chest pain, palpitations, SOB, abdominal pain, joint pain, diarrhea/constipation, urinary symptoms.   Vitals: T 98.1, HR 94, /75, RR 17 satting 100% RA    Attempted to call [facility] myself for collateral but they at first couldnt see that the patient was even sent to the hospital and then they were unable to provide collateral information as they "did not know  the patient" and no specific documentation in his chart. Attempted to call Dr. Horn as that is the listed PCP and no answer
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
## 2023-10-18 NOTE — H&P ADULT - PROBLEM SELECTOR PLAN 1
New  Per ED note pt presented with abnormal behavior from baseline  UA: Nitrite +, LE: large, WBC >50, Bacterial occasional-Ucx pending -> Continue rocephin  Monitor and consult Psych if behavior continues to be abdnormal s/p treatment as may need medication adjustment   Social work consulted given pt concerns regarding NH

## 2023-10-18 NOTE — H&P ADULT - NSHPREVIEWOFSYSTEMS_GEN_ALL_CORE
REVIEW OF SYSTEMS:    CONSTITUTIONAL: No weakness, fevers or chills  EYES/ENT: No visual changes;  No dysphagia; No sore throat; No rhinorrhea; No sinus pain/pressure  NECK: No pain or stiffness  RESPIRATORY: No cough, wheezing, hemoptysis; No shortness of breath  CARDIOVASCULAR: No chest pain or palpitations; No lower extremity edema  GASTROINTESTINAL: No abdominal or epigastric pain. No nausea, vomiting, or hematemesis; No diarrhea or constipation. No melena or hematochezia.  GENITOURINARY: No dysuria, frequency or hematuria  NEUROLOGICAL: No numbness or weakness +intermittent dizziness  MSK: ambulates without assistance   SKIN: No itching, burning, rashes, or lesions   All other review of systems is negative unless indicated above.
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
## 2023-10-18 NOTE — H&P ADULT - NSHPLABSRESULTS_GEN_ALL_CORE
12.5   4.01  )-----------( 114      ( 18 Oct 2023 10:18 )             38.0     142  |  103  |  26<H>  ----------------------------<  129<H>     10  3.0<L>   |  27  |  0.85    Ca    9.3      18 Oct 2023 10:18    TPro  6.7  /  Alb  4.3  /  TBili  0.2  /  DBili  <0.2  /  AST  32  /  ALT  29  /  AlkPhos  56  10-18    Urinalysis Basic - ( 18 Oct 2023 10:18 )  Color: Yellow / Appearance: Cloudy / S.014 / pH: 6.5  Gluc: Negative mg/dL / Ketone: Negative mg/dL  / Bili: Negative / Urobili: 1.0 mg/dL   Blood: Trace / Protein: Trace mg/dL / Nitrite: Positive   Leuk Esterase: Large / RBC: 1 /HPF / WBC >50 /HPF   Sq Epi: x / Non Sq Epi: x / Bacteria: Occasional /HPF

## 2023-10-18 NOTE — ED PROVIDER NOTE - PROGRESS NOTE DETAILS
PA ROSS: Patient signed out to me to f/u. Pt w/ +UTI, likely causes of in behavior as per psych, recommending medical admission with IV antibiotic. IV Ceftriaxone was ordered. Pt was admitted to medicine for acute UTI. Discussed w/ attending, will medicate with Zyprexa prior to IV placement for IV antibiotic. Spoke with CHRIS RN, will medicate prior to moving patient to main ED/ESSU. Will continue to monitor and reassess. PA ROSS: Patient signed out to me to f/u. Pt w/ +UTI, likely cause for the change in behavior as per psych, recommending medical admission with IV antibiotic. IV Ceftriaxone was ordered. Pt was admitted to medicine for acute UTI. Discussed w/ attending, will medicate with Zyprexa prior to IV placement for IV antibiotic. Spoke with  RN, will medicate prior to moving patient to main ED/ESSU. Will continue to monitor and reassess.

## 2023-10-18 NOTE — H&P ADULT - NSHPPHYSICALEXAM_GEN_ALL_CORE
PHYSICAL EXAM:  GENERAL: NAD, comfortable at bedside   HEAD:  Atraumatic, Normocephalic  EYES: EOMI, PERRL, conjunctiva and sclera clear  NECK: Supple, No JVD  CHEST/LUNG: Clear to auscultation bilaterally; No wheezes, rales or rhonchi  HEART: Regular rate and rhythm; No murmurs, rubs, or gallops, (+)S1, S2  ABDOMEN: Soft, Nontender, Nondistended; Normal Bowel sounds   EXTREMITIES:  2+ Peripheral Pulses, No clubbing, cyanosis, or edema  PSYCH: normal mood and affect  NEUROLOGY: AAOx2 (for place he states he is in [place]), moving all extremities spontaneously)  SKIN: No rashes or lesions

## 2023-10-19 DIAGNOSIS — R41.0 DISORIENTATION, UNSPECIFIED: ICD-10-CM

## 2023-10-19 LAB
A1C WITH ESTIMATED AVERAGE GLUCOSE RESULT: 5.2 % — SIGNIFICANT CHANGE UP (ref 4–5.6)
A1C WITH ESTIMATED AVERAGE GLUCOSE RESULT: 5.2 % — SIGNIFICANT CHANGE UP (ref 4–5.6)
ANION GAP SERPL CALC-SCNC: 10 MMOL/L — SIGNIFICANT CHANGE UP (ref 7–14)
ANION GAP SERPL CALC-SCNC: 10 MMOL/L — SIGNIFICANT CHANGE UP (ref 7–14)
BASOPHILS # BLD AUTO: 0.01 K/UL — SIGNIFICANT CHANGE UP (ref 0–0.2)
BASOPHILS # BLD AUTO: 0.01 K/UL — SIGNIFICANT CHANGE UP (ref 0–0.2)
BASOPHILS NFR BLD AUTO: 0.3 % — SIGNIFICANT CHANGE UP (ref 0–2)
BASOPHILS NFR BLD AUTO: 0.3 % — SIGNIFICANT CHANGE UP (ref 0–2)
BUN SERPL-MCNC: 20 MG/DL — SIGNIFICANT CHANGE UP (ref 7–23)
BUN SERPL-MCNC: 20 MG/DL — SIGNIFICANT CHANGE UP (ref 7–23)
CALCIUM SERPL-MCNC: 9.1 MG/DL — SIGNIFICANT CHANGE UP (ref 8.4–10.5)
CALCIUM SERPL-MCNC: 9.1 MG/DL — SIGNIFICANT CHANGE UP (ref 8.4–10.5)
CHLORIDE SERPL-SCNC: 107 MMOL/L — SIGNIFICANT CHANGE UP (ref 98–107)
CHLORIDE SERPL-SCNC: 107 MMOL/L — SIGNIFICANT CHANGE UP (ref 98–107)
CHOLEST SERPL-MCNC: 161 MG/DL — SIGNIFICANT CHANGE UP
CHOLEST SERPL-MCNC: 161 MG/DL — SIGNIFICANT CHANGE UP
CO2 SERPL-SCNC: 27 MMOL/L — SIGNIFICANT CHANGE UP (ref 22–31)
CO2 SERPL-SCNC: 27 MMOL/L — SIGNIFICANT CHANGE UP (ref 22–31)
CREAT SERPL-MCNC: 0.78 MG/DL — SIGNIFICANT CHANGE UP (ref 0.5–1.3)
CREAT SERPL-MCNC: 0.78 MG/DL — SIGNIFICANT CHANGE UP (ref 0.5–1.3)
EGFR: 95 ML/MIN/1.73M2 — SIGNIFICANT CHANGE UP
EGFR: 95 ML/MIN/1.73M2 — SIGNIFICANT CHANGE UP
EOSINOPHIL # BLD AUTO: 0.04 K/UL — SIGNIFICANT CHANGE UP (ref 0–0.5)
EOSINOPHIL # BLD AUTO: 0.04 K/UL — SIGNIFICANT CHANGE UP (ref 0–0.5)
EOSINOPHIL NFR BLD AUTO: 1.1 % — SIGNIFICANT CHANGE UP (ref 0–6)
EOSINOPHIL NFR BLD AUTO: 1.1 % — SIGNIFICANT CHANGE UP (ref 0–6)
ESTIMATED AVERAGE GLUCOSE: 103 — SIGNIFICANT CHANGE UP
ESTIMATED AVERAGE GLUCOSE: 103 — SIGNIFICANT CHANGE UP
GLUCOSE SERPL-MCNC: 85 MG/DL — SIGNIFICANT CHANGE UP (ref 70–99)
GLUCOSE SERPL-MCNC: 85 MG/DL — SIGNIFICANT CHANGE UP (ref 70–99)
HCT VFR BLD CALC: 35.6 % — LOW (ref 39–50)
HCT VFR BLD CALC: 35.6 % — LOW (ref 39–50)
HCV AB S/CO SERPL IA: 0.08 S/CO — SIGNIFICANT CHANGE UP (ref 0–0.99)
HCV AB S/CO SERPL IA: 0.08 S/CO — SIGNIFICANT CHANGE UP (ref 0–0.99)
HCV AB SERPL-IMP: SIGNIFICANT CHANGE UP
HCV AB SERPL-IMP: SIGNIFICANT CHANGE UP
HDLC SERPL-MCNC: 44 MG/DL — SIGNIFICANT CHANGE UP
HDLC SERPL-MCNC: 44 MG/DL — SIGNIFICANT CHANGE UP
HGB BLD-MCNC: 11.8 G/DL — LOW (ref 13–17)
HGB BLD-MCNC: 11.8 G/DL — LOW (ref 13–17)
IANC: 1.82 K/UL — SIGNIFICANT CHANGE UP (ref 1.8–7.4)
IANC: 1.82 K/UL — SIGNIFICANT CHANGE UP (ref 1.8–7.4)
IMM GRANULOCYTES NFR BLD AUTO: 0.8 % — SIGNIFICANT CHANGE UP (ref 0–0.9)
IMM GRANULOCYTES NFR BLD AUTO: 0.8 % — SIGNIFICANT CHANGE UP (ref 0–0.9)
LIPID PNL WITH DIRECT LDL SERPL: 87 MG/DL — SIGNIFICANT CHANGE UP
LIPID PNL WITH DIRECT LDL SERPL: 87 MG/DL — SIGNIFICANT CHANGE UP
LYMPHOCYTES # BLD AUTO: 1.26 K/UL — SIGNIFICANT CHANGE UP (ref 1–3.3)
LYMPHOCYTES # BLD AUTO: 1.26 K/UL — SIGNIFICANT CHANGE UP (ref 1–3.3)
LYMPHOCYTES # BLD AUTO: 35 % — SIGNIFICANT CHANGE UP (ref 13–44)
LYMPHOCYTES # BLD AUTO: 35 % — SIGNIFICANT CHANGE UP (ref 13–44)
MCHC RBC-ENTMCNC: 31.7 PG — SIGNIFICANT CHANGE UP (ref 27–34)
MCHC RBC-ENTMCNC: 31.7 PG — SIGNIFICANT CHANGE UP (ref 27–34)
MCHC RBC-ENTMCNC: 33.1 GM/DL — SIGNIFICANT CHANGE UP (ref 32–36)
MCHC RBC-ENTMCNC: 33.1 GM/DL — SIGNIFICANT CHANGE UP (ref 32–36)
MCV RBC AUTO: 95.7 FL — SIGNIFICANT CHANGE UP (ref 80–100)
MCV RBC AUTO: 95.7 FL — SIGNIFICANT CHANGE UP (ref 80–100)
MONOCYTES # BLD AUTO: 0.44 K/UL — SIGNIFICANT CHANGE UP (ref 0–0.9)
MONOCYTES # BLD AUTO: 0.44 K/UL — SIGNIFICANT CHANGE UP (ref 0–0.9)
MONOCYTES NFR BLD AUTO: 12.2 % — SIGNIFICANT CHANGE UP (ref 2–14)
MONOCYTES NFR BLD AUTO: 12.2 % — SIGNIFICANT CHANGE UP (ref 2–14)
NEUTROPHILS # BLD AUTO: 1.82 K/UL — SIGNIFICANT CHANGE UP (ref 1.8–7.4)
NEUTROPHILS # BLD AUTO: 1.82 K/UL — SIGNIFICANT CHANGE UP (ref 1.8–7.4)
NEUTROPHILS NFR BLD AUTO: 50.6 % — SIGNIFICANT CHANGE UP (ref 43–77)
NEUTROPHILS NFR BLD AUTO: 50.6 % — SIGNIFICANT CHANGE UP (ref 43–77)
NON HDL CHOLESTEROL: 117 MG/DL — SIGNIFICANT CHANGE UP
NON HDL CHOLESTEROL: 117 MG/DL — SIGNIFICANT CHANGE UP
NRBC # BLD: 0 /100 WBCS — SIGNIFICANT CHANGE UP (ref 0–0)
NRBC # BLD: 0 /100 WBCS — SIGNIFICANT CHANGE UP (ref 0–0)
NRBC # FLD: 0 K/UL — SIGNIFICANT CHANGE UP (ref 0–0)
NRBC # FLD: 0 K/UL — SIGNIFICANT CHANGE UP (ref 0–0)
PLATELET # BLD AUTO: 118 K/UL — LOW (ref 150–400)
PLATELET # BLD AUTO: 118 K/UL — LOW (ref 150–400)
POTASSIUM SERPL-MCNC: 3.8 MMOL/L — SIGNIFICANT CHANGE UP (ref 3.5–5.3)
POTASSIUM SERPL-MCNC: 3.8 MMOL/L — SIGNIFICANT CHANGE UP (ref 3.5–5.3)
POTASSIUM SERPL-SCNC: 3.8 MMOL/L — SIGNIFICANT CHANGE UP (ref 3.5–5.3)
POTASSIUM SERPL-SCNC: 3.8 MMOL/L — SIGNIFICANT CHANGE UP (ref 3.5–5.3)
RBC # BLD: 3.72 M/UL — LOW (ref 4.2–5.8)
RBC # BLD: 3.72 M/UL — LOW (ref 4.2–5.8)
RBC # FLD: 13 % — SIGNIFICANT CHANGE UP (ref 10.3–14.5)
RBC # FLD: 13 % — SIGNIFICANT CHANGE UP (ref 10.3–14.5)
SODIUM SERPL-SCNC: 144 MMOL/L — SIGNIFICANT CHANGE UP (ref 135–145)
SODIUM SERPL-SCNC: 144 MMOL/L — SIGNIFICANT CHANGE UP (ref 135–145)
TRIGL SERPL-MCNC: 150 MG/DL — HIGH
TRIGL SERPL-MCNC: 150 MG/DL — HIGH
WBC # BLD: 3.6 K/UL — LOW (ref 3.8–10.5)
WBC # BLD: 3.6 K/UL — LOW (ref 3.8–10.5)
WBC # FLD AUTO: 3.6 K/UL — LOW (ref 3.8–10.5)
WBC # FLD AUTO: 3.6 K/UL — LOW (ref 3.8–10.5)

## 2023-10-19 PROCEDURE — 90792 PSYCH DIAG EVAL W/MED SRVCS: CPT

## 2023-10-19 RX ADMIN — Medication 0.5 MILLIGRAM(S): at 05:50

## 2023-10-19 RX ADMIN — DIVALPROEX SODIUM 500 MILLIGRAM(S): 500 TABLET, DELAYED RELEASE ORAL at 18:04

## 2023-10-19 RX ADMIN — Medication 100 MILLIEQUIVALENT(S): at 05:52

## 2023-10-19 RX ADMIN — CEFTRIAXONE 100 MILLIGRAM(S): 500 INJECTION, POWDER, FOR SOLUTION INTRAMUSCULAR; INTRAVENOUS at 18:04

## 2023-10-19 RX ADMIN — DIVALPROEX SODIUM 500 MILLIGRAM(S): 500 TABLET, DELAYED RELEASE ORAL at 05:50

## 2023-10-19 RX ADMIN — TAMSULOSIN HYDROCHLORIDE 0.8 MILLIGRAM(S): 0.4 CAPSULE ORAL at 21:21

## 2023-10-19 RX ADMIN — QUETIAPINE FUMARATE 400 MILLIGRAM(S): 200 TABLET, FILM COATED ORAL at 21:21

## 2023-10-19 RX ADMIN — Medication 0.5 MILLIGRAM(S): at 18:13

## 2023-10-19 RX ADMIN — Medication 100 MILLIEQUIVALENT(S): at 03:50

## 2023-10-19 RX ADMIN — Medication 100 MILLIEQUIVALENT(S): at 01:50

## 2023-10-19 NOTE — BH CONSULTATION LIAISON ASSESSMENT NOTE - PAST PSYCHOTROPIC MEDICATION
His medications were changed in June he was on Seroquel 300 mg twice daily that was decreased to only 400 mg nightly.  Zyprexa 5 mg was discontinued. Daytime dizziness was the reason to DC Zyprexa

## 2023-10-19 NOTE — BH CONSULTATION LIAISON ASSESSMENT NOTE - NSBHATTESTCOMMENTATTENDFT_PSY_A_CORE
Chart reviewed, pt. seen/evaluated with Dr. Talavera, I  agree with above assessment/plan. Patient alert, grossly oriented, mildly disorganized/tangential/concrete but redirectable, making appropriate jokes at times, denies si and hi. Plan as above, will follow

## 2023-10-19 NOTE — PHYSICAL THERAPY INITIAL EVALUATION ADULT - NSPTDISCHREC_GEN_A_CORE
home with no skilled PT needs. Will be taken off PT program, patient is at functional baseline informed Montserrat from social work/No skilled PT needs

## 2023-10-19 NOTE — PHYSICAL THERAPY INITIAL EVALUATION ADULT - BED MOBILITY LIMITATIONS, REHAB EVAL
bed mobility not assessed, patient found and left sitting in chair in hallway with staff presence nearby

## 2023-10-19 NOTE — PHYSICAL THERAPY INITIAL EVALUATION ADULT - MANUAL MUSCLE TESTING RESULTS, REHAB EVAL
PAtients bilateral upper extremity strength grossly 4+/5, bilateral lower extremity strength grossly  4/5 upon MMT and functional assessments
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
## 2023-10-19 NOTE — BH CONSULTATION LIAISON ASSESSMENT NOTE - OTHER PAST PSYCHIATRIC HISTORY (INCLUDE DETAILS REGARDING ONSET, COURSE OF ILLNESS, INPATIENT/OUTPATIENT TREATMENT)
h/o of schizoaffective disorder non compliant with meds. His medications were changed in June he was on Seroquel 300 mg twice daily that was decreased to only 400 mg nightly.  Zyprexa 5 mg was discontinued.

## 2023-10-19 NOTE — PHYSICAL THERAPY INITIAL EVALUATION ADULT - GENERAL OBSERVATIONS, REHAB EVAL
Patient found sitting in chair in hallway, NAD, ABIMAEL&Ox2-3, patient agreeable to participate in skilled PT evaluation, spO2 99%, HR 90

## 2023-10-19 NOTE — BH CONSULTATION LIAISON ASSESSMENT NOTE - NSBHCHARTREVIEWVS_PSY_A_CORE FT
Vital Signs Last 24 Hrs  T(C): 36.4 (19 Oct 2023 06:36), Max: 36.8 (18 Oct 2023 20:30)  T(F): 97.6 (19 Oct 2023 06:36), Max: 98.2 (18 Oct 2023 20:30)  HR: 67 (19 Oct 2023 06:36) (67 - 98)  BP: 110/68 (19 Oct 2023 06:36) (110/68 - 136/69)  BP(mean): 86 (18 Oct 2023 20:30) (86 - 86)  RR: 18 (19 Oct 2023 06:36) (17 - 20)  SpO2: 100% (19 Oct 2023 06:36) (98% - 100%)    Parameters below as of 19 Oct 2023 06:36  Patient On (Oxygen Delivery Method): room air

## 2023-10-19 NOTE — PHYSICAL THERAPY INITIAL EVALUATION ADULT - PERTINENT HX OF CURRENT PROBLEM, REHAB EVAL
Patient is a 73 year old male with PMHx of HTN, Schizoaffective disorder, EPS, BPH comes to ED for change in behaviour. Patient has lived at [facility] for some time over the past week there has been a change in his behavior. Patient admitted for encephalopathy and acute UTI.

## 2023-10-19 NOTE — BH CONSULTATION LIAISON ASSESSMENT NOTE - NSBHREFERDETAILS_PSY_A_CORE_FT
h/o of schizoaffective disorder non compliant with meds now admitted with UTI and electrolyte imbalances with some component of delirium

## 2023-10-19 NOTE — BH CONSULTATION LIAISON ASSESSMENT NOTE - SUMMARY
73 year old male with PMH HTN, Schizoaffective disorder, EPS, BPH comes to ED for change in behaviour. Patient has lived at [facility] for some time over the past week there has been a change in his behavior.  They checked basic labs which were unremarkable.  Patient's was refusing to give them urine. His medications were changed in June he was on Seroquel 300 mg twice daily that was decreased to only 400 mg nightly.  Zyprexa 5 mg was discontinued.  NP at the facility states this was secondary to daytime dizziness.    He is currently admitted and being treated for UTI and hypokalemia likely cause of AMS.     10/19: Affect angry, engaging in conversations no SI    Plan:     CONTINUE Depakote 500 BID and Seroquel 400 mg at bedtime   CONTINUE benztropine 0.5 mg for EPS  Recommend EKG to check QTC continue SSRI's if QTC is < 500   Monitor PLT, LFT's while on Depakote  Monitor electrolyte and replete them as needed to keep potassium > 4 and Magnesium >2   CONTINUE treatment for UTI as there could be a component of delirium in addition to schizoaffective disorder   Monitor for side effects from ANTI-CHOLINERGIC medications (benztropine)  Encourage PO oral intake  Psychiatry team will continue to follow    Dispo: TBD      73 year old male with PMH HTN, Schizoaffective disorder, EPS, BPH comes to ED for change in behaviour. Patient has lived at [facility] for some time over the past week there has been a change in his behavior.  They checked basic labs which were unremarkable.  Patient's was refusing to give them urine. His medications were changed in June he was on Seroquel 300 mg twice daily that was decreased to only 400 mg nightly.  Zyprexa 5 mg was discontinued.  NP at the facility states this was secondary to daytime dizziness.    He is currently admitted and being treated for UTI and hypokalemia likely cause of AMS.     10/19: Affect angry, engaging in conversations no SI    Plan:     CONTINUE Depakote 500 BID and Seroquel 400 mg at bedtime   CONTINUE benztropine 0.5 mg for EPS  Recommend EKG to check QTC continue SSRI's if QTC is < 500   Monitor PLT, LFT's while on Depakote  Monitor electrolyte and replete them as needed to keep potassium > 4 and Magnesium >2   CONTINUE treatment for UTI as there could be a component of delirium in addition to schizoaffective disorder   Monitor for side effects from ANTI-CHOLINERGIC medications (benztropine)  START Seroquel 25 mg PO PRN q 6 hours for mild agitation  START Zyprexa 2.5 mg IM for moderate agitation can give an additional dose of zyprexa for refractory agitation in 30 mins  Encourage PO oral intake  Psychiatry team will continue to follow    Dispo: TBD      73 year old male with PMH HTN, Schizoaffective disorder, EPS, BPH comes to ED for change in behaviour. Patient has lived at [facility] for some time over the past week there has been a change in his behavior.  They checked basic labs which were unremarkable.  Patient's was refusing to give them urine. His medications were changed in June he was on Seroquel 300 mg twice daily that was decreased to only 400 mg nightly.  Zyprexa 5 mg was discontinued.  NP at the facility states this was secondary to daytime dizziness.    He is currently admitted and being treated for UTI and hypokalemia likely cause of AMS.     10/19: Affect angry, engaging in conversations no SI    Plan:     CONTINUE Depakote 500 BID and Seroquel 400 mg at bedtime (Monitor PLT, LFT's, VPA level, Ammonia while on Depakote)  CONTINUE benztropine 0.5 mg for EPS (Monitor for side effects from ANTI-CHOLINERGIC medications)  Recommend EKG to check QTC continue SSRI's if QTC is < 500   Monitor electrolyte and replete them as needed to keep potassium > 4 and Magnesium >2   CONTINUE treatment for UTI as there could be a component of delirium in addition to schizoaffective disorder   START Seroquel 25 mg PO PRN q 6 hours for mild agitation  START Zyprexa 2.5 mg IM for moderate agitation can give an additional dose of zyprexa for refractory agitation in 30 mins  Encourage PO oral intake  Psychiatry team will continue to follow    Dispo: TBD     Patient is a 73 yr old man with a pmh of HTN, schizoaffective disorder, EPS, BPH, PVD who presents with change in his behavior over the past week. Received Zyprexa 5mgIm x1 on 10/18. Consulted for medication management.     10/19: He is currently admitted and being treated for UTI and hypokalemia likely cause of AMS. Patient alert, grossly oriented, mildly disorganized, engaging in conversations,  no SI or HI. Limited insight    Plan:   CONTINUE Depakote 500 mg BID and Seroquel 400 mg at bedtime (Monitor PLT, LFT's, VPA level, Ammonia while on Depakote)  CONTINUE benztropine 0.5 mg for EPS (Monitor for side effects from ANTI-CHOLINERGIC medications)  Recommend EKG to check QTC , HOLD antipsychotics if QTC is >500   Monitor electrolyte and replete them as needed to keep potassium > 4 and Magnesium >2   CONTINUE treatment for UTI as there could be a component of delirium in addition to schizoaffective disorder     PRN:   START Seroquel 25 mg PO PRN q6 hours for mild agitation  START Zyprexa 2.5 mg IM for severe agitation can give an additional dose of Zyprexa 2.5mg for refractory agitation in 30 mins  Encourage PO oral intake  Psychiatry team will continue to follow    Dispo: [facility]     Patient is a 73 yr old man with a pmh of HTN, schizoaffective disorder, EPS, BPH, PVD who presents with change in his behavior over the past week. Received Zyprexa 5mgIm x1 on 10/18. Consulted for medication management.     10/19: He is currently admitted and being treated for UTI and hypokalemia likely cause of AMS. Patient alert, grossly oriented, mildly disorganized, engaging in conversations,  no SI or HI. Limited insight    Plan:   CONTINUE Depakote 500 mg BID and Seroquel 400 mg at bedtime (Monitor PLT, LFT's, VPA level, Ammonia while on Depakote)  CONTINUE benztropine 0.5 mg for EPS (Monitor for side effects from ANTI-CHOLINERGIC medications)  Recommend EKG to check QTC , HOLD antipsychotics if QTC is >500   Monitor electrolyte and replete them as needed to keep potassium > 4 and Magnesium >2   CONTINUE treatment for UTI as there could be a component of delirium in addition to schizoaffective disorder     PRN:   START Seroquel 25 mg PO PRN q6 hours for mild agitation  START Zyprexa 2.5 mg IM q6h for severe agitation,  can give an additional dose of Zyprexa 2.5mg for refractory agitation in 30 mins  Encourage PO oral intake  Psychiatry team will continue to follow    Dispo: [facility]

## 2023-10-19 NOTE — BH CONSULTATION LIAISON ASSESSMENT NOTE - CURRENT MEDICATION
MEDICATIONS  (STANDING):  benztropine 0.5 milliGRAM(s) Oral two times a day  cefTRIAXone   IVPB 1000 milliGRAM(s) IV Intermittent every 24 hours  divalproex  milliGRAM(s) Oral two times a day  enoxaparin Injectable 40 milliGRAM(s) SubCutaneous every 24 hours  QUEtiapine 400 milliGRAM(s) Oral at bedtime  tamsulosin 0.8 milliGRAM(s) Oral at bedtime    MEDICATIONS  (PRN):  acetaminophen     Tablet .. 650 milliGRAM(s) Oral every 6 hours PRN Temp greater or equal to 38C (100.4F), Mild Pain (1 - 3)  aluminum hydroxide/magnesium hydroxide/simethicone Suspension 30 milliLiter(s) Oral every 4 hours PRN Dyspepsia  melatonin 3 milliGRAM(s) Oral at bedtime PRN Insomnia  ondansetron Injectable 4 milliGRAM(s) IV Push every 8 hours PRN Nausea and/or Vomiting

## 2023-10-19 NOTE — BH CONSULTATION LIAISON ASSESSMENT NOTE - DETAILS
1980 suicide attempt by over dosing on pills (does not remember which medication) was at [facility]
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
## 2023-10-19 NOTE — PHYSICAL THERAPY INITIAL EVALUATION ADULT - ADDITIONAL COMMENTS
As per patient he was living in a nursing facility prior to admission. No further social history/prior level of function was able to be obtained due to patient with cognitive deficits.    Patient left sitting in chair in hallway with RN nearby, NAD, updated social work on patients functional status.

## 2023-10-20 LAB
ANION GAP SERPL CALC-SCNC: 10 MMOL/L — SIGNIFICANT CHANGE UP (ref 7–14)
ANION GAP SERPL CALC-SCNC: 10 MMOL/L — SIGNIFICANT CHANGE UP (ref 7–14)
BUN SERPL-MCNC: 15 MG/DL — SIGNIFICANT CHANGE UP (ref 7–23)
BUN SERPL-MCNC: 15 MG/DL — SIGNIFICANT CHANGE UP (ref 7–23)
CALCIUM SERPL-MCNC: 9 MG/DL — SIGNIFICANT CHANGE UP (ref 8.4–10.5)
CALCIUM SERPL-MCNC: 9 MG/DL — SIGNIFICANT CHANGE UP (ref 8.4–10.5)
CHLORIDE SERPL-SCNC: 107 MMOL/L — SIGNIFICANT CHANGE UP (ref 98–107)
CHLORIDE SERPL-SCNC: 107 MMOL/L — SIGNIFICANT CHANGE UP (ref 98–107)
CO2 SERPL-SCNC: 27 MMOL/L — SIGNIFICANT CHANGE UP (ref 22–31)
CO2 SERPL-SCNC: 27 MMOL/L — SIGNIFICANT CHANGE UP (ref 22–31)
CREAT SERPL-MCNC: 0.7 MG/DL — SIGNIFICANT CHANGE UP (ref 0.5–1.3)
CREAT SERPL-MCNC: 0.7 MG/DL — SIGNIFICANT CHANGE UP (ref 0.5–1.3)
CULTURE RESULTS: SIGNIFICANT CHANGE UP
CULTURE RESULTS: SIGNIFICANT CHANGE UP
EGFR: 98 ML/MIN/1.73M2 — SIGNIFICANT CHANGE UP
EGFR: 98 ML/MIN/1.73M2 — SIGNIFICANT CHANGE UP
GLUCOSE SERPL-MCNC: 84 MG/DL — SIGNIFICANT CHANGE UP (ref 70–99)
GLUCOSE SERPL-MCNC: 84 MG/DL — SIGNIFICANT CHANGE UP (ref 70–99)
HCT VFR BLD CALC: 35 % — LOW (ref 39–50)
HCT VFR BLD CALC: 35 % — LOW (ref 39–50)
HGB BLD-MCNC: 11.5 G/DL — LOW (ref 13–17)
HGB BLD-MCNC: 11.5 G/DL — LOW (ref 13–17)
MAGNESIUM SERPL-MCNC: 2.2 MG/DL — SIGNIFICANT CHANGE UP (ref 1.6–2.6)
MAGNESIUM SERPL-MCNC: 2.2 MG/DL — SIGNIFICANT CHANGE UP (ref 1.6–2.6)
MCHC RBC-ENTMCNC: 31.9 PG — SIGNIFICANT CHANGE UP (ref 27–34)
MCHC RBC-ENTMCNC: 31.9 PG — SIGNIFICANT CHANGE UP (ref 27–34)
MCHC RBC-ENTMCNC: 32.9 GM/DL — SIGNIFICANT CHANGE UP (ref 32–36)
MCHC RBC-ENTMCNC: 32.9 GM/DL — SIGNIFICANT CHANGE UP (ref 32–36)
MCV RBC AUTO: 97.2 FL — SIGNIFICANT CHANGE UP (ref 80–100)
MCV RBC AUTO: 97.2 FL — SIGNIFICANT CHANGE UP (ref 80–100)
NRBC # BLD: 0 /100 WBCS — SIGNIFICANT CHANGE UP (ref 0–0)
NRBC # BLD: 0 /100 WBCS — SIGNIFICANT CHANGE UP (ref 0–0)
NRBC # FLD: 0 K/UL — SIGNIFICANT CHANGE UP (ref 0–0)
NRBC # FLD: 0 K/UL — SIGNIFICANT CHANGE UP (ref 0–0)
PHOSPHATE SERPL-MCNC: 2 MG/DL — LOW (ref 2.5–4.5)
PHOSPHATE SERPL-MCNC: 2 MG/DL — LOW (ref 2.5–4.5)
PLATELET # BLD AUTO: 111 K/UL — LOW (ref 150–400)
PLATELET # BLD AUTO: 111 K/UL — LOW (ref 150–400)
POTASSIUM SERPL-MCNC: 3.4 MMOL/L — LOW (ref 3.5–5.3)
POTASSIUM SERPL-MCNC: 3.4 MMOL/L — LOW (ref 3.5–5.3)
POTASSIUM SERPL-SCNC: 3.4 MMOL/L — LOW (ref 3.5–5.3)
POTASSIUM SERPL-SCNC: 3.4 MMOL/L — LOW (ref 3.5–5.3)
RBC # BLD: 3.6 M/UL — LOW (ref 4.2–5.8)
RBC # BLD: 3.6 M/UL — LOW (ref 4.2–5.8)
RBC # FLD: 13.2 % — SIGNIFICANT CHANGE UP (ref 10.3–14.5)
RBC # FLD: 13.2 % — SIGNIFICANT CHANGE UP (ref 10.3–14.5)
SODIUM SERPL-SCNC: 144 MMOL/L — SIGNIFICANT CHANGE UP (ref 135–145)
SODIUM SERPL-SCNC: 144 MMOL/L — SIGNIFICANT CHANGE UP (ref 135–145)
SPECIMEN SOURCE: SIGNIFICANT CHANGE UP
SPECIMEN SOURCE: SIGNIFICANT CHANGE UP
WBC # BLD: 3.12 K/UL — LOW (ref 3.8–10.5)
WBC # BLD: 3.12 K/UL — LOW (ref 3.8–10.5)
WBC # FLD AUTO: 3.12 K/UL — LOW (ref 3.8–10.5)
WBC # FLD AUTO: 3.12 K/UL — LOW (ref 3.8–10.5)

## 2023-10-20 PROCEDURE — 99232 SBSQ HOSP IP/OBS MODERATE 35: CPT

## 2023-10-20 RX ORDER — PANTOPRAZOLE SODIUM 20 MG/1
40 TABLET, DELAYED RELEASE ORAL
Refills: 0 | Status: DISCONTINUED | OUTPATIENT
Start: 2023-10-20 | End: 2023-10-24

## 2023-10-20 RX ORDER — SODIUM,POTASSIUM PHOSPHATES 278-250MG
1 POWDER IN PACKET (EA) ORAL ONCE
Refills: 0 | Status: COMPLETED | OUTPATIENT
Start: 2023-10-20 | End: 2023-10-20

## 2023-10-20 RX ORDER — POTASSIUM CHLORIDE 20 MEQ
40 PACKET (EA) ORAL ONCE
Refills: 0 | Status: COMPLETED | OUTPATIENT
Start: 2023-10-20 | End: 2023-10-20

## 2023-10-20 RX ADMIN — DIVALPROEX SODIUM 500 MILLIGRAM(S): 500 TABLET, DELAYED RELEASE ORAL at 06:01

## 2023-10-20 RX ADMIN — QUETIAPINE FUMARATE 400 MILLIGRAM(S): 200 TABLET, FILM COATED ORAL at 21:36

## 2023-10-20 RX ADMIN — Medication 1 PACKET(S): at 09:20

## 2023-10-20 RX ADMIN — Medication 0.5 MILLIGRAM(S): at 17:33

## 2023-10-20 RX ADMIN — CEFTRIAXONE 100 MILLIGRAM(S): 500 INJECTION, POWDER, FOR SOLUTION INTRAMUSCULAR; INTRAVENOUS at 17:32

## 2023-10-20 RX ADMIN — Medication 40 MILLIEQUIVALENT(S): at 09:20

## 2023-10-20 RX ADMIN — Medication 0.5 MILLIGRAM(S): at 06:02

## 2023-10-20 RX ADMIN — DIVALPROEX SODIUM 500 MILLIGRAM(S): 500 TABLET, DELAYED RELEASE ORAL at 17:33

## 2023-10-20 RX ADMIN — TAMSULOSIN HYDROCHLORIDE 0.8 MILLIGRAM(S): 0.4 CAPSULE ORAL at 21:36

## 2023-10-20 NOTE — PROGRESS NOTE ADULT - SUBJECTIVE AND OBJECTIVE BOX
· Subjective and Objective:   CHIEF COMPLAINT:patient was seen on the floor awake and verbal without agitation    Reason for Admission: UTI, encephalopathy  History of Present Illness:   73 yr old male with a pmh of HTN, schizoaffective disorder, EPS, BPH, PVD who presents with change in his behavior over the past week.   Pt himself reports that the [facility] "buys adult movies which make me masturbate". He also reports he does not like the food there and "the staff are stupid" per ED note. Also per ED note "Of note they state that they do usually have a psychiatrist there however psychiatrist has been on vacation therefore they sent him here for evaluation."  Pt endorses intermittent dizziness upon standing.  Denies  headache, chest pain, palpitations, SOB, abdominal pain, joint pain, diarrhea/constipation, urinary symptoms.   Vitals: T 98.1, HR 94, /75, RR 17 satting 100% RA       SUBJECTIVE:     REVIEW OF SYSTEMS:    CONSTITUTIONAL: (x  )  weakness,  (  ) fevers or chills  EYES/ENT: (  )visual changes;     NECK: (  ) pain or stiffness  RESPIRATORY:   (  )cough, wheezing, hemoptysis;  (  ) shortness of breath  CARDIOVASCULAR:  (  )chest pain or palpitations  GASTROINTESTINAL:   (  )abdominal or epigastric pain.  (  ) nausea, vomiting, or hematemesis;   (   ) diarrhea or constipation.   GENITOURINARY:   (    ) dysuria, frequency or hematuria  NEUROLOGICAL:  (   ) numbness or weakness   All other review of systems is negative unless indicated above    Vital Signs Last 24 Hrs   ICU Vital Signs Last 24 Hrs  T(C): 36.7 (20 Oct 2023 11:05), Max: 37 (19 Oct 2023 20:35)  T(F): 98 (20 Oct 2023 11:05), Max: 98.6 (19 Oct 2023 20:35)  HR: 71 (20 Oct 2023 11:05) (64 - 90)  BP: 108/64 (20 Oct 2023 11:05) (108/64 - 139/72)  BP(mean): 84 (20 Oct 2023 05:25) (84 - 84)  ABP: --  ABP(mean): --  RR: 18 (20 Oct 2023 11:05) (18 - 18)  SpO2: 100% (20 Oct 2023 11:05) (97% - 100%)    O2 Parameters below as of 20 Oct 2023 11:05  Patient On (Oxygen Delivery Method): room air              I&O's Summary      CAPILLARY BLOOD GLUCOSE          PHYSICAL EXAM:    Constitutional:  ( x  ) NAD,   ( x  )awake and verbal  HEENT: PERR, EOMI,    Neck: Soft and supple, No LAD, No JVD  Respiratory:  (  x  Breath sounds are clear bilaterally,    (   ) wheezing, rales or rhonchi  Cardiovascular:     (x   )S1 and S2, regular rate and rhythm, no Murmurs, gallops or rubs  Gastrointestinal:  (  x )Bowel Sounds present, soft,   (  )nontender, nondistended,    Extremities:    (  ) peripheral edema  Vascular: 2+ peripheral pulses  Neurological:    (  x  )A/O x  2   (  ) focal deficits  Musculoskeletal:    (   )  normal strength b/l upper  (     ) normal  lower extremities  Skin: No rashes    MEDICATIONS:  MEDICATIONS  (STANDING):  benztropine 0.5 milliGRAM(s) Oral two times a day  cefTRIAXone   IVPB 1000 milliGRAM(s) IV Intermittent every 24 hours  divalproex  milliGRAM(s) Oral two times a day  enoxaparin Injectable 40 milliGRAM(s) SubCutaneous every 24 hours  QUEtiapine 400 milliGRAM(s) Oral at bedtime  tamsulosin 0.8 milliGRAM(s) Oral at bedtime      LABS: All Labs Reviewed:                        11.8   3.60  )-----------( 118      ( 19 Oct 2023 06:45 )             35.6     10-19    144  |  107  |  20  ----------------------------<  85  3.8   |  27  |  0.78    Ca    9.1      19 Oct 2023 06:45  Mg     1.90     10-18    TPro  6.7  /  Alb  4.3  /  TBili  0.2  /  DBili  <0.2  /  AST  32  /  ALT  29  /  AlkPhos  56  10-18      .  Labs reviewed personally.                          11.5   3.12  )-----------( 111      ( 20 Oct 2023 04:45 )             35.0     Hgb Trend: 11.5<--, 11.8<--, 12.5<--  10-20    144  |  107  |  15  ----------------------------<  84  3.4<L>   |  27  |  0.70    Ca    9.0      20 Oct 2023 04:45  Phos  2.0     10-20  Mg     2.20     10-20      Creatinine Trend: 0.70<--, 0.78<--, 0.85<--        Urinalysis Basic - ( 20 Oct 2023 04:45 )    Color: x / Appearance: x / SG: x / pH: x  Gluc: 84 mg/dL / Ketone: x  / Bili: x / Urobili: x   Blood: x / Protein: x / Nitrite: x   Leuk Esterase: x / RBC: x / WBC x   Sq Epi: x / Non Sq Epi: x / Bacteria: x        Culture - Urine (collected 18 Oct 2023 16:54)  Source: Clean Catch Clean Catch (Midstream)  Preliminary Report (19 Oct 2023 23:24):    Culture in progress        Imaging reviewed personally:      EKG reviewed personally:    Blood Culture:   Urine Culture      RADIOLOGY/EKG:    ASSESSMENT AND PLAN:  73 yr old male presenting with encephalopathy likely 2.2 UTI      Problem/Plan - 1:  ·  Problem: Metabolic encephalopathy.   ·  Plan: New  Per ED note pt presented with abnormal behavior from baseline  UA: Nitrite +, LE: large, WBC >50, Bacterial occasional-Ucx pending -> Continue rocephin  Monitor and consult Psych if behavior continues to be abdnormal s/p treatment as may need medication adjustment   Social work consulted given pt concerns regarding NH.    Problem/Plan - 2:  ·  Problem: Acute UTI.   ·  Plan: New  UA: Nitrite +, LE: large, WBC >50, Bacterial occasional  Attempted to get culture hx from NH but they were unable to provide me with that information   Ucx pending   Continue rocephin.    Problem/Plan - 3:  ·  Problem: Electrolyte depletion.   ·  Plan: New  K 3.0 s/p 30mEq KCl  Mg added on  BMP in AM.    Problem/Plan - 4:  ·  Problem: Schizoaffective disorder.   ·  Plan: Chronic unstable  Likely change in behavior 2/2 UTI  Continue to monitor and consult Psych if required  Continue divalproex 500mg BID (valproic acid level 70.90).    Problem/Plan - 5:  ·  Problem: HTN (hypertension).   ·  Plan: Chronic stable  /75  Hold HCTZ given hypokalemia  Monitor.    Problem/Plan - 6:  ·  Problem: Extrapyramidal disorder.   ·  Plan: Chronic stable  Continue benztropine 0.5mg BID  Orthostatics ordered as reports dizziness.    -10/20/2023 condition stable without agitation discussed with the psychiatrist regarding his medical management and discharge plan      final urine culture pending continue Rocephin

## 2023-10-20 NOTE — BH CONSULTATION LIAISON PROGRESS NOTE - OTHER
abnormal articulation of words  mildly disorganized, overall goal directed variable, overall has been calm

## 2023-10-20 NOTE — BH CONSULTATION LIAISON PROGRESS NOTE - NSBHASSESSMENTFT_PSY_ALL_CORE
Patient is a 73 yr old man with a pmh of HTN, schizoaffective disorder, EPS, BPH, PVD who presents with change in his behavior over the past week. Received Zyprexa 5mgIm x1 on 10/18. Consulted for medication management.     10/19: He is currently admitted and being treated for UTI and hypokalemia likely cause of AMS. Patient alert, grossly oriented, mildly disorganized, engaging in conversations,  no SI or HI. Limited insight    10/20: Doing better with UTI treatment. Alert, oriented, engaging in conversations, no SI/HI.     Plan:   Improving delirium on treatment unlikely will require inpatient psychiatry admission.  CONTINUE Depakote 500 mg BID and Seroquel 400 mg at bedtime (Monitor PLT, LFT's, VPA level, Ammonia while on Depakote)  CONTINUE benztropine 0.5 mg for EPS (Monitor for side effects from ANTI-CHOLINERGIC medications)  Recommend EKG to check QTC , HOLD antipsychotics if QTC is >500   Monitor electrolyte and replete them as needed to keep potassium > 4 and Magnesium >2   CONTINUE treatment for UTI as there could be a component of delirium in addition to schizoaffective disorder     PRN:   START Seroquel 25 mg PO PRN q6 hours for mild agitation  START Zyprexa 2.5 mg IM q6h for severe agitation,  can give an additional dose of Zyprexa 2.5mg for refractory agitation in 30 mins  Encourage PO oral intake  Psychiatry team will continue to follow Patient is a 73 yr old man with a pmh of HTN, schizoaffective disorder, EPS, BPH, PVD who presents with change in his behavior over the past week. Received Zyprexa 5mgIm x1 on 10/18. Consulted for medication management.     10/19: He is currently admitted and being treated for UTI and hypokalemia likely cause of AMS. Patient alert, grossly oriented, mildly disorganized, engaging in conversations,  no SI or HI. Limited insight    10/20: Doing better with UTI treatment. Alert, oriented, engaging in conversations, no SI/HI.     Plan:   CONTINUE Depakote 500 mg BID and Seroquel 400 mg at bedtime (Monitor PLT, LFT's, VPA level, Ammonia while on Depakote)  CONTINUE benztropine 0.5 mg for EPS (Monitor for side effects from ANTI-CHOLINERGIC medications)  Recommend EKG to check QTC , HOLD antipsychotics if QTC is >500   Monitor electrolyte and replete them as needed to keep potassium > 4 and Magnesium >2   CONTINUE treatment for UTI as there could be a component of delirium in addition to schizoaffective disorder     PRN:   Seroquel 25 mg PO PRN q6 hours for mild agitation  Zyprexa 2.5 mg IM q6h for severe agitation,  can give an additional dose of Zyprexa 2.5mg for refractory agitation in 30 mins  Encourage PO oral intake      Dispo:  Improving delirium on treatment unlikely will require inpatient psychiatry admission. Will continue to follow up

## 2023-10-20 NOTE — BH CONSULTATION LIAISON PROGRESS NOTE - NSBHATTESTCOMMENTATTENDFT_PSY_A_CORE
Chart reviewed, pt. seen/evaluated, I agree with above assessment/plan. Patient alert, grossly oriented, cooperative, reports mood is better, no evidence of acute psychosis, no si or hi. case d/w Dr. Horn, will follow

## 2023-10-20 NOTE — BH CONSULTATION LIAISON PROGRESS NOTE - NSBHFUPINTERVALHXFT_PSY_A_CORE
Did not receive PRN doses. Case discussed during IDR no events reported. Today he looks well groomed, appetite and sleep are good. No AH/VH. No SI/HI. More engaging answering questions. Complaint with medications. no agitation, affect is not angry/impulsive. He had his breakfast this morning. He endorses his mood is sometimes good and sometimes bad. Not feeling depressed.                                                                                                                                                                                                                                                                                                                                                                                                   Did not receive PRN doses. Case discussed during IDR no events reported. Today he looks well groomed, appetite and sleep are good. No AH/VH. No SI/HI. More engaging answering questions. Compliant with medications. no agitation, affect is euthymic. He had his breakfast this morning. He endorses his mood is sometimes good and sometimes bad. Not feeling depressed.

## 2023-10-21 LAB
ANION GAP SERPL CALC-SCNC: 10 MMOL/L — SIGNIFICANT CHANGE UP (ref 7–14)
ANION GAP SERPL CALC-SCNC: 10 MMOL/L — SIGNIFICANT CHANGE UP (ref 7–14)
BUN SERPL-MCNC: 15 MG/DL — SIGNIFICANT CHANGE UP (ref 7–23)
BUN SERPL-MCNC: 15 MG/DL — SIGNIFICANT CHANGE UP (ref 7–23)
CALCIUM SERPL-MCNC: 8.6 MG/DL — SIGNIFICANT CHANGE UP (ref 8.4–10.5)
CALCIUM SERPL-MCNC: 8.6 MG/DL — SIGNIFICANT CHANGE UP (ref 8.4–10.5)
CHLORIDE SERPL-SCNC: 111 MMOL/L — HIGH (ref 98–107)
CHLORIDE SERPL-SCNC: 111 MMOL/L — HIGH (ref 98–107)
CO2 SERPL-SCNC: 24 MMOL/L — SIGNIFICANT CHANGE UP (ref 22–31)
CO2 SERPL-SCNC: 24 MMOL/L — SIGNIFICANT CHANGE UP (ref 22–31)
CREAT SERPL-MCNC: 0.71 MG/DL — SIGNIFICANT CHANGE UP (ref 0.5–1.3)
CREAT SERPL-MCNC: 0.71 MG/DL — SIGNIFICANT CHANGE UP (ref 0.5–1.3)
EGFR: 97 ML/MIN/1.73M2 — SIGNIFICANT CHANGE UP
EGFR: 97 ML/MIN/1.73M2 — SIGNIFICANT CHANGE UP
GLUCOSE SERPL-MCNC: 82 MG/DL — SIGNIFICANT CHANGE UP (ref 70–99)
GLUCOSE SERPL-MCNC: 82 MG/DL — SIGNIFICANT CHANGE UP (ref 70–99)
HCT VFR BLD CALC: 34.8 % — LOW (ref 39–50)
HCT VFR BLD CALC: 34.8 % — LOW (ref 39–50)
HGB BLD-MCNC: 11.1 G/DL — LOW (ref 13–17)
HGB BLD-MCNC: 11.1 G/DL — LOW (ref 13–17)
MAGNESIUM SERPL-MCNC: 2.3 MG/DL — SIGNIFICANT CHANGE UP (ref 1.6–2.6)
MAGNESIUM SERPL-MCNC: 2.3 MG/DL — SIGNIFICANT CHANGE UP (ref 1.6–2.6)
MCHC RBC-ENTMCNC: 31.4 PG — SIGNIFICANT CHANGE UP (ref 27–34)
MCHC RBC-ENTMCNC: 31.4 PG — SIGNIFICANT CHANGE UP (ref 27–34)
MCHC RBC-ENTMCNC: 31.9 GM/DL — LOW (ref 32–36)
MCHC RBC-ENTMCNC: 31.9 GM/DL — LOW (ref 32–36)
MCV RBC AUTO: 98.3 FL — SIGNIFICANT CHANGE UP (ref 80–100)
MCV RBC AUTO: 98.3 FL — SIGNIFICANT CHANGE UP (ref 80–100)
NRBC # BLD: 0 /100 WBCS — SIGNIFICANT CHANGE UP (ref 0–0)
NRBC # BLD: 0 /100 WBCS — SIGNIFICANT CHANGE UP (ref 0–0)
NRBC # FLD: 0 K/UL — SIGNIFICANT CHANGE UP (ref 0–0)
NRBC # FLD: 0 K/UL — SIGNIFICANT CHANGE UP (ref 0–0)
PHOSPHATE SERPL-MCNC: 2.2 MG/DL — LOW (ref 2.5–4.5)
PHOSPHATE SERPL-MCNC: 2.2 MG/DL — LOW (ref 2.5–4.5)
PLATELET # BLD AUTO: 105 K/UL — LOW (ref 150–400)
PLATELET # BLD AUTO: 105 K/UL — LOW (ref 150–400)
POTASSIUM SERPL-MCNC: 4 MMOL/L — SIGNIFICANT CHANGE UP (ref 3.5–5.3)
POTASSIUM SERPL-MCNC: 4 MMOL/L — SIGNIFICANT CHANGE UP (ref 3.5–5.3)
POTASSIUM SERPL-SCNC: 4 MMOL/L — SIGNIFICANT CHANGE UP (ref 3.5–5.3)
POTASSIUM SERPL-SCNC: 4 MMOL/L — SIGNIFICANT CHANGE UP (ref 3.5–5.3)
RBC # BLD: 3.54 M/UL — LOW (ref 4.2–5.8)
RBC # BLD: 3.54 M/UL — LOW (ref 4.2–5.8)
RBC # FLD: 13.2 % — SIGNIFICANT CHANGE UP (ref 10.3–14.5)
RBC # FLD: 13.2 % — SIGNIFICANT CHANGE UP (ref 10.3–14.5)
SODIUM SERPL-SCNC: 145 MMOL/L — SIGNIFICANT CHANGE UP (ref 135–145)
SODIUM SERPL-SCNC: 145 MMOL/L — SIGNIFICANT CHANGE UP (ref 135–145)
WBC # BLD: 2.89 K/UL — LOW (ref 3.8–10.5)
WBC # BLD: 2.89 K/UL — LOW (ref 3.8–10.5)
WBC # FLD AUTO: 2.89 K/UL — LOW (ref 3.8–10.5)
WBC # FLD AUTO: 2.89 K/UL — LOW (ref 3.8–10.5)

## 2023-10-21 RX ORDER — SODIUM,POTASSIUM PHOSPHATES 278-250MG
1 POWDER IN PACKET (EA) ORAL ONCE
Refills: 0 | Status: COMPLETED | OUTPATIENT
Start: 2023-10-21 | End: 2023-10-21

## 2023-10-21 RX ADMIN — CEFTRIAXONE 100 MILLIGRAM(S): 500 INJECTION, POWDER, FOR SOLUTION INTRAMUSCULAR; INTRAVENOUS at 18:23

## 2023-10-21 RX ADMIN — ENOXAPARIN SODIUM 40 MILLIGRAM(S): 100 INJECTION SUBCUTANEOUS at 21:55

## 2023-10-21 RX ADMIN — PANTOPRAZOLE SODIUM 40 MILLIGRAM(S): 20 TABLET, DELAYED RELEASE ORAL at 05:30

## 2023-10-21 RX ADMIN — Medication 0.5 MILLIGRAM(S): at 05:30

## 2023-10-21 RX ADMIN — Medication 0.5 MILLIGRAM(S): at 18:26

## 2023-10-21 RX ADMIN — Medication 1 PACKET(S): at 09:49

## 2023-10-21 RX ADMIN — DIVALPROEX SODIUM 500 MILLIGRAM(S): 500 TABLET, DELAYED RELEASE ORAL at 18:24

## 2023-10-21 RX ADMIN — QUETIAPINE FUMARATE 400 MILLIGRAM(S): 200 TABLET, FILM COATED ORAL at 21:51

## 2023-10-21 RX ADMIN — DIVALPROEX SODIUM 500 MILLIGRAM(S): 500 TABLET, DELAYED RELEASE ORAL at 05:30

## 2023-10-21 RX ADMIN — TAMSULOSIN HYDROCHLORIDE 0.8 MILLIGRAM(S): 0.4 CAPSULE ORAL at 21:51

## 2023-10-21 NOTE — PROGRESS NOTE ADULT - SUBJECTIVE AND OBJECTIVE BOX
CHIEF COMPLAINT:patient was seen on the floor awake and verbal without agitation    Reason for Admission: UTI, encephalopathy  History of Present Illness:   73 yr old male with a pmh of HTN, schizoaffective disorder, EPS, BPH, PVD who presents with change in his behavior over the past week.   Pt himself reports that the [facility] "buys adult movies which make me masturbate". He also reports he does not like the food there and "the staff are stupid" per ED note. Also per ED note "Of note they state that they do usually have a psychiatrist there however psychiatrist has been on vacation therefore they sent him here for evaluation."  Pt endorses intermittent dizziness upon standing.  Denies  headache, chest pain, palpitations, SOB, abdominal pain, joint pain, diarrhea/constipation, urinary symptoms.   Vitals: T 98.1, HR 94, /75, RR 17 satting 100% RA       SUBJECTIVE:     REVIEW OF SYSTEMS:    CONSTITUTIONAL: (x  )  weakness,  (  ) fevers or chills  EYES/ENT: (  )visual changes;     NECK: (  ) pain or stiffness  RESPIRATORY:   (  )cough, wheezing, hemoptysis;  (  ) shortness of breath  CARDIOVASCULAR:  (  )chest pain or palpitations  GASTROINTESTINAL:   (  )abdominal or epigastric pain.  (  ) nausea, vomiting, or hematemesis;   (   ) diarrhea or constipation.   GENITOURINARY:   (    ) dysuria, frequency or hematuria  NEUROLOGICAL:  (   ) numbness or weakness   All other review of systems is negative unless indicated above    Vital Signs Last 24 Hrs    ICU Vital Signs Last 24 Hrs  T(C): 36.7 (21 Oct 2023 12:12), Max: 37.1 (20 Oct 2023 21:42)  T(F): 98 (21 Oct 2023 12:12), Max: 98.8 (20 Oct 2023 21:42)  HR: 78 (21 Oct 2023 12:12) (57 - 78)  BP: 112/72 (21 Oct 2023 12:12) (112/72 - 146/82)  BP(mean): 81 (21 Oct 2023 12:12) (81 - 81)  ABP: --  ABP(mean): --  RR: 18 (21 Oct 2023 12:12) (17 - 18)  SpO2: 100% (21 Oct 2023 12:12) (98% - 100%)    O2 Parameters below as of 21 Oct 2023 12:12  Patient On (Oxygen Delivery Method): room air          ABP: --  ABP(mean): --  RR: 18 (20 Oct 2023 11:05) (18 - 18)  SpO2: 100% (20 Oct 2023 11:05) (97% - 100%)    O2 Parameters below as of 20 Oct 2023 11:05  Patient On (Oxygen Delivery Method): room air              I&O's Summary      CAPILLARY BLOOD GLUCOSE          PHYSICAL EXAM:    Constitutional:  ( x  ) NAD,   ( x  )awake and verbal  HEENT: PERR, EOMI,    Neck: Soft and supple, No LAD, No JVD  Respiratory:  (  x  Breath sounds are clear bilaterally,    (   ) wheezing, rales or rhonchi  Cardiovascular:     (x   )S1 and S2, regular rate and rhythm, no Murmurs, gallops or rubs  Gastrointestinal:  (  x )Bowel Sounds present, soft,   (  )nontender, nondistended,    Extremities:    (  ) peripheral edema  Vascular: 2+ peripheral pulses  Neurological:    (  x  )A/O x  2   (  ) focal deficits  Musculoskeletal:    (   )  normal strength b/l upper  (     ) normal  lower extremities  Skin: No rashes    MEDICATIONS:  MEDICATIONS  (STANDING):  benztropine 0.5 milliGRAM(s) Oral two times a day  cefTRIAXone   IVPB 1000 milliGRAM(s) IV Intermittent every 24 hours  divalproex  milliGRAM(s) Oral two times a day  enoxaparin Injectable 40 milliGRAM(s) SubCutaneous every 24 hours  QUEtiapine 400 milliGRAM(s) Oral at bedtime  tamsulosin 0.8 milliGRAM(s) Oral at bedtime      LABS: All Labs Reviewed:                        11.8   3.60  )-----------( 118      ( 19 Oct 2023 06:45 )             35.6     10-19    144  |  107  |  20  ----------------------------<  85  3.8   |  27  |  0.78    Ca    9.1      19 Oct 2023 06:45  Mg     1.90     10-18    TPro  6.7  /  Alb  4.3  /  TBili  0.2  /  DBili  <0.2  /  AST  32  /  ALT  29  /  AlkPhos  56  10-18      .  Labs reviewed personally.                          11.5   3.12  )-----------( 111      ( 20 Oct 2023 04:45 )             35.0     Hgb Trend: 11.5<--, 11.8<--, 12.5<--  10-20    144  |  107  |  15  ----------------------------<  84  3.4<L>   |  27  |  0.70    Ca    9.0      20 Oct 2023 04:45  Phos  2.0     10-20  Mg     2.20     10-20      Creatinine Trend: 0.70<--, 0.78<--, 0.85<--        Urinalysis Basic - ( 20 Oct 2023 04:45 )    Color: x / Appearance: x / SG: x / pH: x  Gluc: 84 mg/dL / Ketone: x  / Bili: x / Urobili: x   Blood: x / Protein: x / Nitrite: x   Leuk Esterase: x / RBC: x / WBC x   Sq Epi: x / Non Sq Epi: x / Bacteria: x        Culture - Urine (collected 18 Oct 2023 16:54)  Source: Clean Catch Clean Catch (Midstream)  Preliminary Report (19 Oct 2023 23:24):    Culture in progress        Imaging reviewed personally:      EKG reviewed personally:    Blood Culture:   Urine Culture  .  Labs reviewed personally.                          11.1   2.89  )-----------( 105      ( 21 Oct 2023 04:42 )             34.8     Hgb Trend: 11.1<--, 11.5<--, 11.8<--, 12.5<--  10-21    145  |  111<H>  |  15  ----------------------------<  82  4.0   |  24  |  0.71    Ca    8.6      21 Oct 2023 04:42  Phos  2.2     10-21  Mg     2.30     10-21      Creatinine Trend: 0.71<--, 0.70<--, 0.78<--, 0.85<--        Urinalysis Basic - ( 21 Oct 2023 04:42 )    Color: x / Appearance: x / SG: x / pH: x  Gluc: 82 mg/dL / Ketone: x  / Bili: x / Urobili: x   Blood: x / Protein: x / Nitrite: x   Leuk Esterase: x / RBC: x / WBC x   Sq Epi: x / Non Sq Epi: x / Bacteria: x        Culture - Urine (collected 18 Oct 2023 16:54)  Source: Clean Catch Clean Catch (Midstream)  Final Report (20 Oct 2023 18:26):    >100,000 CFU/ml Coag Negative Staphylococcus "Susceptibilities not    performed"           RADIOLOGY/EKG:    ASSESSMENT AND PLAN:  73 yr old male presenting with encephalopathy likely 2.2 UTI      Problem/Plan - 1:  ·  Problem: Metabolic encephalopathy.   ·  Plan: New  Per ED note pt presented with abnormal behavior from baseline  UA: Nitrite +, LE: large, WBC >50, Bacterial occasional-Ucx pending -> Continue rocephin  Monitor and consult Psych if behavior continues to be abdnormal s/p treatment as may need medication adjustment   Social work consulted given pt concerns regarding NH.    Problem/Plan - 2:  ·  Problem: Acute UTI.   ·  Plan: New  UA: Nitrite +, LE: large, WBC >50, Bacterial occasional  Attempted to get culture hx from NH but they were unable to provide me with that information   Ucx pending   Continue rocephin.    Problem/Plan - 3:  ·  Problem: Electrolyte depletion.   ·  Plan: New  K 3.0 s/p 30mEq KCl  Mg added on  BMP in AM.    Problem/Plan - 4:  ·  Problem: Schizoaffective disorder.   ·  Plan: Chronic unstable  Likely change in behavior 2/2 UTI  Continue to monitor and consult Psych if required  Continue divalproex 500mg BID (valproic acid level 70.90).    Problem/Plan - 5:  ·  Problem: HTN (hypertension).   ·  Plan: Chronic stable  /75  Hold HCTZ given hypokalemia  Monitor.    Problem/Plan - 6:  ·  Problem: Extrapyramidal disorder.   ·  Plan: Chronic stable  Continue benztropine 0.5mg BID  Orthostatics ordered as reports dizziness.    -10/20/2023 condition stable without agitation discussed with the psychiatrist regarding his medical management and discharge plan       pending continue Rocephin  -10/21/2023  U/C >100,000 CFU/ml Coag Negative Staphylococcus    D/C planinig

## 2023-10-22 LAB
ANION GAP SERPL CALC-SCNC: 9 MMOL/L — SIGNIFICANT CHANGE UP (ref 7–14)
ANION GAP SERPL CALC-SCNC: 9 MMOL/L — SIGNIFICANT CHANGE UP (ref 7–14)
BUN SERPL-MCNC: 17 MG/DL — SIGNIFICANT CHANGE UP (ref 7–23)
BUN SERPL-MCNC: 17 MG/DL — SIGNIFICANT CHANGE UP (ref 7–23)
CALCIUM SERPL-MCNC: 8.5 MG/DL — SIGNIFICANT CHANGE UP (ref 8.4–10.5)
CALCIUM SERPL-MCNC: 8.5 MG/DL — SIGNIFICANT CHANGE UP (ref 8.4–10.5)
CHLORIDE SERPL-SCNC: 110 MMOL/L — HIGH (ref 98–107)
CHLORIDE SERPL-SCNC: 110 MMOL/L — HIGH (ref 98–107)
CO2 SERPL-SCNC: 25 MMOL/L — SIGNIFICANT CHANGE UP (ref 22–31)
CO2 SERPL-SCNC: 25 MMOL/L — SIGNIFICANT CHANGE UP (ref 22–31)
CREAT SERPL-MCNC: 0.76 MG/DL — SIGNIFICANT CHANGE UP (ref 0.5–1.3)
CREAT SERPL-MCNC: 0.76 MG/DL — SIGNIFICANT CHANGE UP (ref 0.5–1.3)
EGFR: 96 ML/MIN/1.73M2 — SIGNIFICANT CHANGE UP
EGFR: 96 ML/MIN/1.73M2 — SIGNIFICANT CHANGE UP
GLUCOSE SERPL-MCNC: 91 MG/DL — SIGNIFICANT CHANGE UP (ref 70–99)
GLUCOSE SERPL-MCNC: 91 MG/DL — SIGNIFICANT CHANGE UP (ref 70–99)
HCT VFR BLD CALC: 32.3 % — LOW (ref 39–50)
HCT VFR BLD CALC: 32.3 % — LOW (ref 39–50)
HGB BLD-MCNC: 10.7 G/DL — LOW (ref 13–17)
HGB BLD-MCNC: 10.7 G/DL — LOW (ref 13–17)
MAGNESIUM SERPL-MCNC: 2.2 MG/DL — SIGNIFICANT CHANGE UP (ref 1.6–2.6)
MAGNESIUM SERPL-MCNC: 2.2 MG/DL — SIGNIFICANT CHANGE UP (ref 1.6–2.6)
MCHC RBC-ENTMCNC: 32 PG — SIGNIFICANT CHANGE UP (ref 27–34)
MCHC RBC-ENTMCNC: 32 PG — SIGNIFICANT CHANGE UP (ref 27–34)
MCHC RBC-ENTMCNC: 33.1 GM/DL — SIGNIFICANT CHANGE UP (ref 32–36)
MCHC RBC-ENTMCNC: 33.1 GM/DL — SIGNIFICANT CHANGE UP (ref 32–36)
MCV RBC AUTO: 96.7 FL — SIGNIFICANT CHANGE UP (ref 80–100)
MCV RBC AUTO: 96.7 FL — SIGNIFICANT CHANGE UP (ref 80–100)
NRBC # BLD: 0 /100 WBCS — SIGNIFICANT CHANGE UP (ref 0–0)
NRBC # BLD: 0 /100 WBCS — SIGNIFICANT CHANGE UP (ref 0–0)
NRBC # FLD: 0 K/UL — SIGNIFICANT CHANGE UP (ref 0–0)
NRBC # FLD: 0 K/UL — SIGNIFICANT CHANGE UP (ref 0–0)
PHOSPHATE SERPL-MCNC: 2.6 MG/DL — SIGNIFICANT CHANGE UP (ref 2.5–4.5)
PHOSPHATE SERPL-MCNC: 2.6 MG/DL — SIGNIFICANT CHANGE UP (ref 2.5–4.5)
PLATELET # BLD AUTO: 98 K/UL — LOW (ref 150–400)
PLATELET # BLD AUTO: 98 K/UL — LOW (ref 150–400)
POTASSIUM SERPL-MCNC: 3.9 MMOL/L — SIGNIFICANT CHANGE UP (ref 3.5–5.3)
POTASSIUM SERPL-MCNC: 3.9 MMOL/L — SIGNIFICANT CHANGE UP (ref 3.5–5.3)
POTASSIUM SERPL-SCNC: 3.9 MMOL/L — SIGNIFICANT CHANGE UP (ref 3.5–5.3)
POTASSIUM SERPL-SCNC: 3.9 MMOL/L — SIGNIFICANT CHANGE UP (ref 3.5–5.3)
RBC # BLD: 3.34 M/UL — LOW (ref 4.2–5.8)
RBC # BLD: 3.34 M/UL — LOW (ref 4.2–5.8)
RBC # FLD: 13.2 % — SIGNIFICANT CHANGE UP (ref 10.3–14.5)
RBC # FLD: 13.2 % — SIGNIFICANT CHANGE UP (ref 10.3–14.5)
SODIUM SERPL-SCNC: 144 MMOL/L — SIGNIFICANT CHANGE UP (ref 135–145)
SODIUM SERPL-SCNC: 144 MMOL/L — SIGNIFICANT CHANGE UP (ref 135–145)
WBC # BLD: 2.71 K/UL — LOW (ref 3.8–10.5)
WBC # BLD: 2.71 K/UL — LOW (ref 3.8–10.5)
WBC # FLD AUTO: 2.71 K/UL — LOW (ref 3.8–10.5)
WBC # FLD AUTO: 2.71 K/UL — LOW (ref 3.8–10.5)

## 2023-10-22 RX ADMIN — Medication 0.5 MILLIGRAM(S): at 18:34

## 2023-10-22 RX ADMIN — DIVALPROEX SODIUM 500 MILLIGRAM(S): 500 TABLET, DELAYED RELEASE ORAL at 06:01

## 2023-10-22 RX ADMIN — TAMSULOSIN HYDROCHLORIDE 0.8 MILLIGRAM(S): 0.4 CAPSULE ORAL at 21:57

## 2023-10-22 RX ADMIN — DIVALPROEX SODIUM 500 MILLIGRAM(S): 500 TABLET, DELAYED RELEASE ORAL at 18:34

## 2023-10-22 RX ADMIN — QUETIAPINE FUMARATE 400 MILLIGRAM(S): 200 TABLET, FILM COATED ORAL at 21:57

## 2023-10-22 RX ADMIN — ENOXAPARIN SODIUM 40 MILLIGRAM(S): 100 INJECTION SUBCUTANEOUS at 21:57

## 2023-10-22 RX ADMIN — Medication 0.5 MILLIGRAM(S): at 06:01

## 2023-10-22 RX ADMIN — PANTOPRAZOLE SODIUM 40 MILLIGRAM(S): 20 TABLET, DELAYED RELEASE ORAL at 06:01

## 2023-10-22 NOTE — PROGRESS NOTE ADULT - SUBJECTIVE AND OBJECTIVE BOX
CHIEF COMPLAINT:patient was seen on the floor awake and verbal without agitation    Reason for Admission: UTI, encephalopathy  History of Present Illness:   73 yr old male with a pmh of HTN, schizoaffective disorder, EPS, BPH, PVD who presents with change in his behavior over the past week.   Pt himself reports that the NH "buys adult movies which make me masturbate". He also reports he does not like the food there and "the staff are stupid" per ED note. Also per ED note "Of note they state that they do usually have a psychiatrist there however psychiatrist has been on vacation therefore they sent him here for evaluation."  Pt endorses intermittent dizziness upon standing.  Denies  headache, chest pain, palpitations, SOB, abdominal pain, joint pain, diarrhea/constipation, urinary symptoms.   Vitals: T 98.1, HR 94, /75, RR 17 satting 100% RA       SUBJECTIVE:     REVIEW OF SYSTEMS:    CONSTITUTIONAL: (x  )  weakness,  (  ) fevers or chills  EYES/ENT: (  )visual changes;     NECK: (  ) pain or stiffness  RESPIRATORY:   (  )cough, wheezing, hemoptysis;  (  ) shortness of breath  CARDIOVASCULAR:  (  )chest pain or palpitations  GASTROINTESTINAL:   (  )abdominal or epigastric pain.  (  ) nausea, vomiting, or hematemesis;   (   ) diarrhea or constipation.   GENITOURINARY:   (    ) dysuria, frequency or hematuria  NEUROLOGICAL:  (   ) numbness or weakness   All other review of systems is negative unless indicated above    Vital Signs Last 24 Hrs    ICU Vital Signs Last 24 Hrs   ICU Vital Signs Last 24 Hrs  T(C): 36.6 (22 Oct 2023 11:26), Max: 37.1 (21 Oct 2023 21:54)  T(F): 97.9 (22 Oct 2023 11:26), Max: 98.7 (21 Oct 2023 21:54)  HR: 75 (22 Oct 2023 11:26) (63 - 75)  BP: 132/79 (22 Oct 2023 11:26) (107/65 - 132/79)  BP(mean): --  ABP: --  ABP(mean): --  RR: 18 (22 Oct 2023 11:26) (17 - 18)  SpO2: 97% (22 Oct 2023 11:26) (97% - 100%)    O2 Parameters below as of 22 Oct 2023 11:26  Patient On (Oxygen Delivery Method): room air          ABP: --  ABP(mean): --  RR: 18 (21 Oct 2023 12:12) (17 - 18)  SpO2: 100% (21 Oct 2023 12:12) (98% - 100%)    O2 Parameters below as of 21 Oct 2023 12:12  Patient On (Oxygen Delivery Method): room air          ABP: --  ABP(mean): --  RR: 18 (20 Oct 2023 11:05) (18 - 18)  SpO2: 100% (20 Oct 2023 11:05) (97% - 100%)    O2 Parameters below as of 20 Oct 2023 11:05  Patient On (Oxygen Delivery Method): room air              I&O's Summary      CAPILLARY BLOOD GLUCOSE          PHYSICAL EXAM:    Constitutional:  ( x  ) NAD,   ( x  )awake and verbal  HEENT: PERR, EOMI,    Neck: Soft and supple, No LAD, No JVD  Respiratory:  (  x  Breath sounds are clear bilaterally,    (   ) wheezing, rales or rhonchi  Cardiovascular:     (x   )S1 and S2, regular rate and rhythm, no Murmurs, gallops or rubs  Gastrointestinal:  (  x )Bowel Sounds present, soft,   (  )nontender, nondistended,    Extremities:    (  ) peripheral edema  Vascular: 2+ peripheral pulses  Neurological:    (  x  )A/O x  2   (  ) focal deficits  Musculoskeletal:    (   )  normal strength b/l upper  (     ) normal  lower extremities  Skin: No rashes    MEDICATIONS:  MEDICATIONS  (STANDING):  benztropine 0.5 milliGRAM(s) Oral two times a day  cefTRIAXone   IVPB 1000 milliGRAM(s) IV Intermittent every 24 hours  divalproex  milliGRAM(s) Oral two times a day  enoxaparin Injectable 40 milliGRAM(s) SubCutaneous every 24 hours  QUEtiapine 400 milliGRAM(s) Oral at bedtime  tamsulosin 0.8 milliGRAM(s) Oral at bedtime      LABS: All Labs Reviewed:                        11.8   3.60  )-----------( 118      ( 19 Oct 2023 06:45 )             35.6     10-19    144  |  107  |  20  ----------------------------<  85  3.8   |  27  |  0.78    Ca    9.1      19 Oct 2023 06:45  Mg     1.90     10-18    TPro  6.7  /  Alb  4.3  /  TBili  0.2  /  DBili  <0.2  /  AST  32  /  ALT  29  /  AlkPhos  56  10-18      .  Labs reviewed personally.                          11.5   3.12  )-----------( 111      ( 20 Oct 2023 04:45 )             35.0     Hgb Trend: 11.5<--, 11.8<--, 12.5<--  10-20    144  |  107  |  15  ----------------------------<  84  3.4<L>   |  27  |  0.70    Ca    9.0      20 Oct 2023 04:45  Phos  2.0     10-20  Mg     2.20     10-20      Creatinine Trend: 0.70<--, 0.78<--, 0.85<--        Urinalysis Basic - ( 20 Oct 2023 04:45 )    Color: x / Appearance: x / SG: x / pH: x  Gluc: 84 mg/dL / Ketone: x  / Bili: x / Urobili: x   Blood: x / Protein: x / Nitrite: x   Leuk Esterase: x / RBC: x / WBC x   Sq Epi: x / Non Sq Epi: x / Bacteria: x        Culture - Urine (collected 18 Oct 2023 16:54)  Source: Clean Catch Clean Catch (Midstream)  Preliminary Report (19 Oct 2023 23:24):    Culture in progress         .  Labs reviewed personally.                          10.7   2.71  )-----------( 98       ( 22 Oct 2023 05:12 )             32.3     Hgb Trend: 10.7<--, 11.1<--, 11.5<--, 11.8<--, 12.5<--  10-22    144  |  110<H>  |  17  ----------------------------<  91  3.9   |  25  |  0.76    Ca    8.5      22 Oct 2023 05:12  Phos  2.6     10-22  Mg     2.20     10-22      Creatinine Trend: 0.76<--, 0.71<--, 0.70<--, 0.78<--, 0.85<--        Urinalysis Basic - ( 22 Oct 2023 05:12 )    Color: x / Appearance: x / SG: x / pH: x  Gluc: 91 mg/dL / Ketone: x  / Bili: x / Urobili: x   Blood: x / Protein: x / Nitrite: x   Leuk Esterase: x / RBC: x / WBC x   Sq Epi: x / Non Sq Epi: x / Bacteria: x          Imaging reviewed personally:                               11.1   2.89  )-----------( 105      ( 21 Oct 2023 04:42 )             34.8     Hgb Trend: 11.1<--, 11.5<--, 11.8<--, 12.5<--  10-21    145  |  111<H>  |  15  ----------------------------<  82  4.0   |  24  |  0.71    Ca    8.6      21 Oct 2023 04:42  Phos  2.2     10-21  Mg     2.30     10-21      Creatinine Trend: 0.71<--, 0.70<--, 0.78<--, 0.85<--        Urinalysis Basic - ( 21 Oct 2023 04:42 )    Color: x / Appearance: x / SG: x / pH: x  Gluc: 82 mg/dL / Ketone: x  / Bili: x / Urobili: x   Blood: x / Protein: x / Nitrite: x   Leuk Esterase: x / RBC: x / WBC x   Sq Epi: x / Non Sq Epi: x / Bacteria: x        Culture - Urine (collected 18 Oct 2023 16:54)  Source: Clean Catch Clean Catch (Midstream)  Final Report (20 Oct 2023 18:26):    >100,000 CFU/ml Coag Negative Staphylococcus "Susceptibilities not    performed"           RADIOLOGY/EKG:    ASSESSMENT AND PLAN:  73 yr old male presenting with encephalopathy likely 2.2 UTI      Problem/Plan - 1:  ·  Problem: Metabolic encephalopathy.   ·  Plan: New  Per ED note pt presented with abnormal behavior from baseline  UA: Nitrite +, LE: large, WBC >50, Bacterial occasional-Ucx pending -> Continue rocephin  Monitor and consult Psych if behavior continues to be abdnormal s/p treatment as may need medication adjustment   Social work consulted given pt concerns regarding NH.    Problem/Plan - 2:  ·  Problem: Acute UTI.   ·  Plan: New  UA: Nitrite +, LE: large, WBC >50, Bacterial occasional  Attempted to get culture hx from NH but they were unable to provide me with that information   Ucx pending   Continue rocephin.    Problem/Plan - 3:  ·  Problem: Electrolyte depletion.   ·  Plan: New  K 3.0 s/p 30mEq KCl  Mg added on  BMP in AM.    Problem/Plan - 4:  ·  Problem: Schizoaffective disorder.   ·  Plan: Chronic unstable  Likely change in behavior 2/2 UTI  Continue to monitor and consult Psych if required  Continue divalproex 500mg BID (valproic acid level 70.90).    Problem/Plan - 5:  ·  Problem: HTN (hypertension).   ·  Plan: Chronic stable  /75  Hold HCTZ given hypokalemia  Monitor.    Problem/Plan - 6:  ·  Problem: Extrapyramidal disorder.   ·  Plan: Chronic stable  Continue benztropine 0.5mg BID  Orthostatics ordered as reports dizziness.    -10/20/2023 condition stable without agitation discussed with the psychiatrist regarding his medical management and discharge plan       pending continue Rocephin  -10/21/2023  U/C >100,000 CFU/ml Coag Negative Staphylococcus    D/C planinig  -10/22/2023 discharge planning discussed with patient and case management discontinue Rocephin

## 2023-10-23 ENCOUNTER — TRANSCRIPTION ENCOUNTER (OUTPATIENT)
Age: 73
End: 2023-10-23

## 2023-10-23 LAB
ANION GAP SERPL CALC-SCNC: 9 MMOL/L — SIGNIFICANT CHANGE UP (ref 7–14)
ANION GAP SERPL CALC-SCNC: 9 MMOL/L — SIGNIFICANT CHANGE UP (ref 7–14)
BUN SERPL-MCNC: 18 MG/DL — SIGNIFICANT CHANGE UP (ref 7–23)
BUN SERPL-MCNC: 18 MG/DL — SIGNIFICANT CHANGE UP (ref 7–23)
CALCIUM SERPL-MCNC: 8.9 MG/DL — SIGNIFICANT CHANGE UP (ref 8.4–10.5)
CALCIUM SERPL-MCNC: 8.9 MG/DL — SIGNIFICANT CHANGE UP (ref 8.4–10.5)
CHLORIDE SERPL-SCNC: 110 MMOL/L — HIGH (ref 98–107)
CHLORIDE SERPL-SCNC: 110 MMOL/L — HIGH (ref 98–107)
CO2 SERPL-SCNC: 25 MMOL/L — SIGNIFICANT CHANGE UP (ref 22–31)
CO2 SERPL-SCNC: 25 MMOL/L — SIGNIFICANT CHANGE UP (ref 22–31)
CREAT SERPL-MCNC: 0.78 MG/DL — SIGNIFICANT CHANGE UP (ref 0.5–1.3)
CREAT SERPL-MCNC: 0.78 MG/DL — SIGNIFICANT CHANGE UP (ref 0.5–1.3)
EGFR: 95 ML/MIN/1.73M2 — SIGNIFICANT CHANGE UP
EGFR: 95 ML/MIN/1.73M2 — SIGNIFICANT CHANGE UP
GLUCOSE SERPL-MCNC: 91 MG/DL — SIGNIFICANT CHANGE UP (ref 70–99)
GLUCOSE SERPL-MCNC: 91 MG/DL — SIGNIFICANT CHANGE UP (ref 70–99)
HCT VFR BLD CALC: 34.1 % — LOW (ref 39–50)
HCT VFR BLD CALC: 34.1 % — LOW (ref 39–50)
HGB BLD-MCNC: 11 G/DL — LOW (ref 13–17)
HGB BLD-MCNC: 11 G/DL — LOW (ref 13–17)
MAGNESIUM SERPL-MCNC: 2.2 MG/DL — SIGNIFICANT CHANGE UP (ref 1.6–2.6)
MAGNESIUM SERPL-MCNC: 2.2 MG/DL — SIGNIFICANT CHANGE UP (ref 1.6–2.6)
MCHC RBC-ENTMCNC: 31.7 PG — SIGNIFICANT CHANGE UP (ref 27–34)
MCHC RBC-ENTMCNC: 31.7 PG — SIGNIFICANT CHANGE UP (ref 27–34)
MCHC RBC-ENTMCNC: 32.3 GM/DL — SIGNIFICANT CHANGE UP (ref 32–36)
MCHC RBC-ENTMCNC: 32.3 GM/DL — SIGNIFICANT CHANGE UP (ref 32–36)
MCV RBC AUTO: 98.3 FL — SIGNIFICANT CHANGE UP (ref 80–100)
MCV RBC AUTO: 98.3 FL — SIGNIFICANT CHANGE UP (ref 80–100)
NRBC # BLD: 0 /100 WBCS — SIGNIFICANT CHANGE UP (ref 0–0)
NRBC # BLD: 0 /100 WBCS — SIGNIFICANT CHANGE UP (ref 0–0)
NRBC # FLD: 0 K/UL — SIGNIFICANT CHANGE UP (ref 0–0)
NRBC # FLD: 0 K/UL — SIGNIFICANT CHANGE UP (ref 0–0)
PHOSPHATE SERPL-MCNC: 2.8 MG/DL — SIGNIFICANT CHANGE UP (ref 2.5–4.5)
PHOSPHATE SERPL-MCNC: 2.8 MG/DL — SIGNIFICANT CHANGE UP (ref 2.5–4.5)
PLATELET # BLD AUTO: 109 K/UL — LOW (ref 150–400)
PLATELET # BLD AUTO: 109 K/UL — LOW (ref 150–400)
POTASSIUM SERPL-MCNC: 3.9 MMOL/L — SIGNIFICANT CHANGE UP (ref 3.5–5.3)
POTASSIUM SERPL-MCNC: 3.9 MMOL/L — SIGNIFICANT CHANGE UP (ref 3.5–5.3)
POTASSIUM SERPL-SCNC: 3.9 MMOL/L — SIGNIFICANT CHANGE UP (ref 3.5–5.3)
POTASSIUM SERPL-SCNC: 3.9 MMOL/L — SIGNIFICANT CHANGE UP (ref 3.5–5.3)
RBC # BLD: 3.47 M/UL — LOW (ref 4.2–5.8)
RBC # BLD: 3.47 M/UL — LOW (ref 4.2–5.8)
RBC # FLD: 13.2 % — SIGNIFICANT CHANGE UP (ref 10.3–14.5)
RBC # FLD: 13.2 % — SIGNIFICANT CHANGE UP (ref 10.3–14.5)
SODIUM SERPL-SCNC: 144 MMOL/L — SIGNIFICANT CHANGE UP (ref 135–145)
SODIUM SERPL-SCNC: 144 MMOL/L — SIGNIFICANT CHANGE UP (ref 135–145)
WBC # BLD: 2.89 K/UL — LOW (ref 3.8–10.5)
WBC # BLD: 2.89 K/UL — LOW (ref 3.8–10.5)
WBC # FLD AUTO: 2.89 K/UL — LOW (ref 3.8–10.5)
WBC # FLD AUTO: 2.89 K/UL — LOW (ref 3.8–10.5)

## 2023-10-23 PROCEDURE — 99232 SBSQ HOSP IP/OBS MODERATE 35: CPT

## 2023-10-23 RX ORDER — HYDROCHLOROTHIAZIDE 25 MG
1 TABLET ORAL
Refills: 0 | DISCHARGE

## 2023-10-23 RX ORDER — PANTOPRAZOLE SODIUM 20 MG/1
1 TABLET, DELAYED RELEASE ORAL
Qty: 0 | Refills: 0 | DISCHARGE
Start: 2023-10-23

## 2023-10-23 RX ORDER — OLANZAPINE 15 MG/1
5 TABLET, FILM COATED ORAL ONCE
Refills: 0 | Status: COMPLETED | OUTPATIENT
Start: 2023-10-23 | End: 2023-10-23

## 2023-10-23 RX ADMIN — TAMSULOSIN HYDROCHLORIDE 0.8 MILLIGRAM(S): 0.4 CAPSULE ORAL at 21:31

## 2023-10-23 RX ADMIN — QUETIAPINE FUMARATE 400 MILLIGRAM(S): 200 TABLET, FILM COATED ORAL at 21:31

## 2023-10-23 RX ADMIN — ENOXAPARIN SODIUM 40 MILLIGRAM(S): 100 INJECTION SUBCUTANEOUS at 21:31

## 2023-10-23 RX ADMIN — OLANZAPINE 5 MILLIGRAM(S): 15 TABLET, FILM COATED ORAL at 11:23

## 2023-10-23 RX ADMIN — Medication 0.5 MILLIGRAM(S): at 05:07

## 2023-10-23 RX ADMIN — Medication 3 MILLIGRAM(S): at 21:31

## 2023-10-23 RX ADMIN — DIVALPROEX SODIUM 500 MILLIGRAM(S): 500 TABLET, DELAYED RELEASE ORAL at 05:06

## 2023-10-23 RX ADMIN — Medication 0.5 MILLIGRAM(S): at 16:46

## 2023-10-23 RX ADMIN — PANTOPRAZOLE SODIUM 40 MILLIGRAM(S): 20 TABLET, DELAYED RELEASE ORAL at 05:06

## 2023-10-23 RX ADMIN — DIVALPROEX SODIUM 500 MILLIGRAM(S): 500 TABLET, DELAYED RELEASE ORAL at 16:45

## 2023-10-23 NOTE — DISCHARGE NOTE NURSING/CASE MANAGEMENT/SOCIAL WORK - PATIENT PORTAL LINK FT
You can access the FollowMyHealth Patient Portal offered by [facility] by registering at the following website: [email or web address]. By joining [facility]’s FollowMyHealth portal, you will also be able to view your health information using other applications (apps) compatible with our system.

## 2023-10-23 NOTE — BH CONSULTATION LIAISON PROGRESS NOTE - NSBHASSESSMENTFT_PSY_ALL_CORE
Patient is a 73 yr old man with a pmh of HTN, schizoaffective disorder, EPS, BPH, PVD who presents with change in his behavior over the past week. Received Zyprexa 5mgIm x1 on 10/18. Consulted for medication management.     10/19: He is currently admitted and being treated for UTI and hypokalemia likely cause of AMS. Patient alert, grossly oriented, mildly disorganized, engaging in conversations,  no SI or HI. Limited insight  10/20: Doing better with UTI treatment. Alert, oriented, engaging in conversations, no SI/HI.     10/23: Likely at baseline. Will continue to monitor but do not feel that he requires inpatient psychiatric admission at this juncture    Plan:   CONTINUE Depakote 500 mg BID and Seroquel 400 mg at bedtime (Monitor PLT, LFT's, VPA level, Ammonia while on Depakote)  	[] continue to monitor platelets- seem to be staying around 100-115  CONTINUE benztropine 0.5 mg for EPS (Monitor for side effects from ANTI-CHOLINERGIC medications)  Recommend EKG to check QTC , HOLD antipsychotics if QTC is >500   Monitor electrolyte and replete them as needed to keep potassium > 4 and Magnesium >2   CONTINUE treatment for UTI as there could be a component of delirium in addition to schizoaffective disorder     PRN:   Seroquel 25 mg PO PRN q6 hours for mild agitation  Zyprexa 2.5 mg IM q6h for severe agitation,  can give an additional dose of Zyprexa 2.5mg for refractory agitation in 30 mins  Encourage PO oral intake      Dispo:  Improving delirium on treatment unlikely will require inpatient psychiatry admission. Will continue to follow up    Patient is a 73 yr old man with a pmh of HTN, schizoaffective disorder, EPS, BPH, PVD who presents with change in his behavior over the past week. Received Zyprexa 5mgIm x1 on 10/18. Consulted for medication management.     10/19: He is currently admitted and being treated for UTI and hypokalemia likely cause of AMS. Patient alert, grossly oriented, mildly disorganized, engaging in conversations,  no SI or HI. Limited insight  10/20: Doing better with UTI treatment. Alert, oriented, engaging in conversations, no SI/HI.     10/23: Likely at baseline. Will continue to monitor but do not feel that he requires inpatient psychiatric admission at this juncture    Plan:   CONTINUE Depakote 500 mg BID and Seroquel 400 mg at bedtime (Monitor PLT, LFT's, VPA level, Ammonia while on Depakote)  	[] continue to monitor platelets- seem to be staying around 100-115- NH will need to monitor this  CONTINUE benztropine 0.5 mg for EPS (Monitor for side effects from ANTI-CHOLINERGIC medications)  Recommend EKG to check QTC , HOLD antipsychotics if QTC is >500   Monitor electrolyte and replete them as needed to keep potassium > 4 and Magnesium >2   CONTINUE treatment for UTI as there could be a component of delirium in addition to schizoaffective disorder     PRN:   Seroquel 25 mg PO PRN q6 hours for mild agitation  Zyprexa 2.5 mg IM q6h for severe agitation,  can give an additional dose of Zyprexa 2.5mg for refractory agitation in 30 mins  Encourage PO oral intake      Dispo:  Improving delirium, do not feel that he would benefit from inpatient psychiatry. Agrees to private at a new NH in [place].

## 2023-10-23 NOTE — BH CONSULTATION LIAISON PROGRESS NOTE - NSBHCHARTREVIEWLAB_PSY_A_CORE FT
11.0   2.89  )-----------( 109      ( 23 Oct 2023 05:07 )             34.1   10-23    144  |  110<H>  |  18  ----------------------------<  91  3.9   |  25  |  0.78    Ca    8.9      23 Oct 2023 05:07  Phos  2.8     10-23  Mg     2.20     10-23

## 2023-10-23 NOTE — BH CONSULTATION LIAISON PROGRESS NOTE - NSBHATTESTBILLING_PSY_A_CORE
99338-[id number] OBS or IP - moderate complexity OR 35-49 mins
94932-[id number] OBS or IP - moderate complexity OR 35-49 mins

## 2023-10-23 NOTE — DISCHARGE NOTE PROVIDER - NSDCMRMEDTOKEN_GEN_ALL_CORE_FT
benztropine 0.5 mg oral tablet: 1 tab(s) orally 2 times a day  divalproex sodium 500 mg oral delayed release tablet: 1 tab(s) orally 2 times a day  Flomax 0.4 mg oral capsule: 2 cap(s) orally once a day (at bedtime)  melatonin 1 mg oral tablet: 1 tab(s) orally once a day (at bedtime)  pantoprazole 40 mg oral delayed release tablet: 1 tab(s) orally once a day (before a meal)  Seroquel 400 mg oral tablet: 1 tab(s) orally once a day (at bedtime)

## 2023-10-23 NOTE — BH CONSULTATION LIAISON PROGRESS NOTE - NSBHCHARTREVIEWVS_PSY_A_CORE FT
Vital Signs Last 24 Hrs  T(C): 36.4 (23 Oct 2023 05:05), Max: 36.6 (22 Oct 2023 11:26)  T(F): 97.6 (23 Oct 2023 05:05), Max: 97.9 (22 Oct 2023 11:26)  HR: 64 (23 Oct 2023 05:05) (64 - 75)  BP: 115/63 (23 Oct 2023 05:05) (115/63 - 132/79)  BP(mean): --  RR: 18 (23 Oct 2023 05:05) (17 - 18)  SpO2: 100% (23 Oct 2023 05:05) (97% - 100%)    Parameters below as of 23 Oct 2023 05:05  Patient On (Oxygen Delivery Method): room air    
Vital Signs Last 24 Hrs  T(C): 36.4 (20 Oct 2023 05:25), Max: 37 (19 Oct 2023 20:35)  T(F): 97.5 (20 Oct 2023 05:25), Max: 98.6 (19 Oct 2023 20:35)  HR: 64 (20 Oct 2023 05:25) (64 - 90)  BP: 111/72 (20 Oct 2023 05:25) (111/72 - 142/72)  BP(mean): 84 (20 Oct 2023 05:25) (84 - 84)  RR: 18 (20 Oct 2023 05:25) (18 - 18)  SpO2: 100% (20 Oct 2023 05:25) (97% - 100%)    Parameters below as of 20 Oct 2023 05:25  Patient On (Oxygen Delivery Method): room air

## 2023-10-23 NOTE — PROGRESS NOTE ADULT - SUBJECTIVE AND OBJECTIVE BOX
CHIEF COMPLAINT:    SUBJECTIVE:     REVIEW OF SYSTEMS:    CONSTITUTIONAL: (  )  weakness,  (  ) fevers or chills  EYES/ENT: (  )visual changes;     NECK: (  ) pain or stiffness  RESPIRATORY:   (  )cough, wheezing, hemoptysis;  (  ) shortness of breath  CARDIOVASCULAR:  (  )chest pain or palpitations  GASTROINTESTINAL:   (  )abdominal or epigastric pain.  (  ) nausea, vomiting, or hematemesis;   (   ) diarrhea or constipation.   GENITOURINARY:   (    ) dysuria, frequency or hematuria  NEUROLOGICAL:  (   ) numbness or weakness   All other review of systems is negative unless indicated above    Vital Signs Last 24 Hrs  T(C): 36.4 (23 Oct 2023 05:05), Max: 36.6 (22 Oct 2023 11:26)  T(F): 97.6 (23 Oct 2023 05:05), Max: 97.9 (22 Oct 2023 11:26)  HR: 64 (23 Oct 2023 05:05) (64 - 75)  BP: 115/63 (23 Oct 2023 05:05) (115/63 - 132/79)  BP(mean): --  RR: 18 (23 Oct 2023 05:05) (17 - 18)  SpO2: 100% (23 Oct 2023 05:05) (97% - 100%)    Parameters below as of 23 Oct 2023 05:05  Patient On (Oxygen Delivery Method): room air        I&O's Summary      CAPILLARY BLOOD GLUCOSE          PHYSICAL EXAM:    Constitutional:  (   ) NAD,   (   )awake and alert  HEENT: PERR, EOMI,    Neck: Soft and supple, No LAD, No JVD  Respiratory:  (    Breath sounds are clear bilaterally,    (   ) wheezing, rales or rhonchi  Cardiovascular:     (   )S1 and S2, regular rate and rhythm, no Murmurs, gallops or rubs  Gastrointestinal:  (   )Bowel Sounds present, soft,   (  )nontender, nondistended,    Extremities:    (  ) peripheral edema  Vascular: 2+ peripheral pulses  Neurological:    (    )A/O x 3,   (  ) focal deficits  Musculoskeletal:    (   )  normal strength b/l upper  (     ) normal  lower extremities  Skin: No rashes    MEDICATIONS:  MEDICATIONS  (STANDING):  benztropine 0.5 milliGRAM(s) Oral two times a day  divalproex  milliGRAM(s) Oral two times a day  enoxaparin Injectable 40 milliGRAM(s) SubCutaneous every 24 hours  pantoprazole    Tablet 40 milliGRAM(s) Oral before breakfast  QUEtiapine 400 milliGRAM(s) Oral at bedtime  tamsulosin 0.8 milliGRAM(s) Oral at bedtime      LABS: All Labs Reviewed:                        11.0   2.89  )-----------( 109      ( 23 Oct 2023 05:07 )             34.1     10-23    144  |  110<H>  |  18  ----------------------------<  91  3.9   |  25  |  0.78    Ca    8.9      23 Oct 2023 05:07  Phos  2.8     10-23  Mg     2.20     10-23            Blood Culture: 10-18 @ 16:54  Organism --  Gram Stain Blood -- Gram Stain --  Specimen Source Clean Catch Clean Catch (Midstream)  Culture-Blood --      Urine Culture      RADIOLOGY/EKG:    ASSESSMENT AND PLAN:    DVT PPX:    ADVANCED DIRECTIVE:    DISPOSITION: CHIEF COMPLAINT:   CHIEF COMPLAINT:patient was seen x awake and verbal without agitation      73 yr old male with a pmh of HTN, schizoaffective disorder, EPS, BPH, PVD who presents with change in his behavior over the past week.   Pt himself reports that the NH "buys adult movies which make me masturbate". He also reports he does not like the food there and "the staff are stupid" per ED note. Also per ED note "Of note they state that they do usually have a psychiatrist there however psychiatrist has been on vacation therefore they sent him here for evaluation."  Pt endorses intermittent dizziness upon standing.  Denies  headache, chest pain, palpitations, SOB, abdominal pain, joint pain, diarrhea/constipation, urinary symptoms.   Vitals: T 98.1, HR 94, /75, RR 17 satting 100% RA    SUBJECTIVE:     REVIEW OF SYSTEMS:    CONSTITUTIONAL: (  )  weakness,  (  ) fevers or chills  EYES/ENT: (  )visual changes;     NECK: (  ) pain or stiffness  RESPIRATORY:   (  )cough, wheezing, hemoptysis;  (  ) shortness of breath  CARDIOVASCULAR:  (  )chest pain or palpitations  GASTROINTESTINAL:   (  )abdominal or epigastric pain.  (  ) nausea, vomiting, or hematemesis;   (   ) diarrhea or constipation.   GENITOURINARY:   (    ) dysuria, frequency or hematuria  NEUROLOGICAL:  (   ) numbness or weakness   All other review of systems is negative unless indicated above    Vital Signs Last 24 Hrs  T(C): 36.4 (23 Oct 2023 05:05), Max: 36.6 (22 Oct 2023 11:26)  T(F): 97.6 (23 Oct 2023 05:05), Max: 97.9 (22 Oct 2023 11:26)  HR: 64 (23 Oct 2023 05:05) (64 - 75)  BP: 115/63 (23 Oct 2023 05:05) (115/63 - 132/79)  BP(mean): --  RR: 18 (23 Oct 2023 05:05) (17 - 18)  SpO2: 100% (23 Oct 2023 05:05) (97% - 100%)    Parameters below as of 23 Oct 2023 05:05  Patient On (Oxygen Delivery Method): room air        I&O's Summary      CAPILLARY BLOOD GLUCOSE          PHYSICAL EXAM:    Constitutional:  ( x  ) NAD,   (   )awake and alert  HEENT: PERR, EOMI,    Neck: Soft and supple, No LAD, No JVD  Respiratory:  ( x   Breath sounds are clear bilaterally,    (   ) wheezing, rales or rhonchi  Cardiovascular:     ( x  )S1 and S2, regular rate and rhythm, no Murmurs, gallops or rubs  Gastrointestinal:  ( x  )Bowel Sounds present, soft,   (  )nontender, nondistended,    Extremities:    (  ) peripheral edema  Vascular: 2+ peripheral pulses  Neurological:    (  x  )A/O x 3,   (  ) focal deficits  Musculoskeletal:    (   )  normal strength b/l upper  (     ) normal  lower extremities  Skin: No rashes    MEDICATIONS:  MEDICATIONS  (STANDING):  benztropine 0.5 milliGRAM(s) Oral two times a day  divalproex  milliGRAM(s) Oral two times a day  enoxaparin Injectable 40 milliGRAM(s) SubCutaneous every 24 hours  pantoprazole    Tablet 40 milliGRAM(s) Oral before breakfast  QUEtiapine 400 milliGRAM(s) Oral at bedtime  tamsulosin 0.8 milliGRAM(s) Oral at bedtime      LABS: All Labs Reviewed:                        11.0   2.89  )-----------( 109      ( 23 Oct 2023 05:07 )             34.1     10-23    144  |  110<H>  |  18  ----------------------------<  91  3.9   |  25  |  0.78    Ca    8.9      23 Oct 2023 05:07  Phos  2.8     10-23  Mg     2.20     10-23            Blood Culture: 10-18 @ 16:54  Organism --  Gram Stain Blood -- Gram Stain --  Specimen Source Clean Catch Clean Catch (Midstream)  Culture-Blood --      Urine Culture      RADIOLOGY/EKG:    ASSESSMENT AND PLAN:  73 yr old male presenting with encephalopathy likely 2.2 UTI      Problem/Plan - 1:  ·  Problem: Metabolic encephalopathy.   ·  Plan: New  Per ED note pt presented with abnormal behavior from baseline  UA: Nitrite +, LE: large, WBC >50, Bacterial occasional-Ucx pending -> Continue rocephin  Monitor and consult Psych if behavior continues to be abdnormal s/p treatment as may need medication adjustment   Social work consulted given pt concerns regarding NH.    Problem/Plan - 2:  ·  Problem: Acute UTI.   ·  Plan: New  UA: Nitrite +, LE: large, WBC >50, Bacterial occasional  Attempted to get culture hx from NH but they were unable to provide me with that information   Ucx pending   Continue rocephin.    Problem/Plan - 3:  ·  Problem: Electrolyte depletion.   ·  Plan: New  K 3.0 s/p 30mEq KCl  Mg added on  BMP in AM.    Problem/Plan - 4:  ·  Problem: Schizoaffective disorder.   ·  Plan: Chronic unstable  Likely change in behavior 2/2 UTI  Continue to monitor and consult Psych if required  Continue divalproex 500mg BID (valproic acid level 70.90).    Problem/Plan - 5:  ·  Problem: HTN (hypertension).   ·  Plan: Chronic stable  /75  Hold HCTZ given hypokalemia  Monitor.    Problem/Plan - 6:  ·  Problem: Extrapyramidal disorder.   ·  Plan: Chronic stable  Continue benztropine 0.5mg BID  Orthostatics ordered as reports dizziness.    -10/20/2023 condition stable without agitation discussed with the psychiatrist regarding his medical management and discharge plan       pending continue Rocephin  -10/21/2023  U/C >100,000 CFU/ml Coag Negative Staphylococcus    D/C planinig  -10/22/2023 discharge planning discussed with patient and case management discontinue Rocephin  -10/23/2023 discharge planning discussed with the case management        DVT PPX:    ADVANCED DIRECTIVE:    DISPOSITION:

## 2023-10-23 NOTE — BH CONSULTATION LIAISON PROGRESS NOTE - NSBHFUPINTERVALHXFT_PSY_A_CORE
Did not receive PRN doses. Case discussed during IDR no events reported.   He was frustrated earlier in the AM- after speaking with Nurse Manager, it appears that part of it may have been related to not being understood.  He perseverates on not wanting to go to [place] or [facility]. Later he tells me that sometimes he has delusions, but he feels safe, denies SI/HI, no ah/vh elicited. States that the food is better here than in [facility].

## 2023-10-23 NOTE — BH CONSULTATION LIAISON PROGRESS NOTE - CURRENT MEDICATION
MEDICATIONS  (STANDING):  benztropine 0.5 milliGRAM(s) Oral two times a day  cefTRIAXone   IVPB 1000 milliGRAM(s) IV Intermittent every 24 hours  divalproex  milliGRAM(s) Oral two times a day  enoxaparin Injectable 40 milliGRAM(s) SubCutaneous every 24 hours  QUEtiapine 400 milliGRAM(s) Oral at bedtime  tamsulosin 0.8 milliGRAM(s) Oral at bedtime    MEDICATIONS  (PRN):  acetaminophen     Tablet .. 650 milliGRAM(s) Oral every 6 hours PRN Temp greater or equal to 38C (100.4F), Mild Pain (1 - 3)  aluminum hydroxide/magnesium hydroxide/simethicone Suspension 30 milliLiter(s) Oral every 4 hours PRN Dyspepsia  melatonin 3 milliGRAM(s) Oral at bedtime PRN Insomnia  ondansetron Injectable 4 milliGRAM(s) IV Push every 8 hours PRN Nausea and/or Vomiting  
MEDICATIONS  (STANDING):  benztropine 0.5 milliGRAM(s) Oral two times a day  divalproex  milliGRAM(s) Oral two times a day  enoxaparin Injectable 40 milliGRAM(s) SubCutaneous every 24 hours  OLANZapine Injectable 5 milliGRAM(s) IntraMuscular once  pantoprazole    Tablet 40 milliGRAM(s) Oral before breakfast  QUEtiapine 400 milliGRAM(s) Oral at bedtime  tamsulosin 0.8 milliGRAM(s) Oral at bedtime    MEDICATIONS  (PRN):  acetaminophen     Tablet .. 650 milliGRAM(s) Oral every 6 hours PRN Temp greater or equal to 38C (100.4F), Mild Pain (1 - 3)  aluminum hydroxide/magnesium hydroxide/simethicone Suspension 30 milliLiter(s) Oral every 4 hours PRN Dyspepsia  melatonin 3 milliGRAM(s) Oral at bedtime PRN Insomnia  ondansetron Injectable 4 milliGRAM(s) IV Push every 8 hours PRN Nausea and/or Vomiting

## 2023-10-23 NOTE — DISCHARGE NOTE NURSING/CASE MANAGEMENT/SOCIAL WORK - NSDCPEFALRISK_GEN_ALL_CORE
For information on Fall & Injury Prevention, visit: [email or web address] OR  [email or web address] OR  https://www.cdc.gov/steadi/patient.html

## 2023-10-23 NOTE — BH CONSULTATION LIAISON PROGRESS NOTE - NSICDXBHSECONDARYDX_PSY_ALL_CORE
Schizoaffective disorder   F25.9  Delirium   R41.0  
Schizoaffective disorder   F25.9  Delirium   R41.0

## 2023-10-23 NOTE — DISCHARGE NOTE NURSING/CASE MANAGEMENT/SOCIAL WORK - NSDCCRNAME_GEN_ALL_CORE_FT
[facility] [place] [address] [facility], [address], [place] [id number]

## 2023-10-23 NOTE — BH CONSULTATION LIAISON PROGRESS NOTE - NSBHCONSULTFOLLOWAFTERCARE_PSY_A_CORE FT
NH should have psychiatry follow up his medication    [facility]  [address] [facility] [address] [phone]

## 2023-10-23 NOTE — DISCHARGE NOTE PROVIDER - NSFOLLOWUPCLINICS_GEN_ALL_ED_FT
[facility]  Psychiatry  [address] [place] [address]  Phone: [phone]  Fax:

## 2023-10-23 NOTE — BH CONSULTATION LIAISON PROGRESS NOTE - MSE UNSTRUCTURED FT
Mental Status Exam:  Karen: well groomed, fair hygiene, however missing teeth    Behavior: calm, cooperative, no psychomotor retardation/agitation  Motor: no tremors, EPS, or rigidity  Gait: did not assess, pt in bed  Speech: stutter, speech impediment  Mood: "okay"  Affect: more anxious appearing today  Thought process: clear, goal directed, somewhat perseverative on where he is going to go and where he does not want to go   Thought Content: denies paranoia, delusions- though he states he sometimes has delusions (cannot elaborate); doesn't want to go to [facility] or [facility]   Perception: denies AH/VH  SI: denies  HI: denies  Insight: fair  Judgment: fair    Cognitive Exam:  Orientation: AOx3  Recall: intact  Attention: intact

## 2023-10-23 NOTE — DISCHARGE NOTE PROVIDER - HOSPITAL COURSE
73 yr old male with a pmh of HTN, schizoaffective disorder, EPS, BPH, PVD who presents with change in his behavior over the past week. Pt himself reports that the NH "buys adult movies which make me masturbate". He also reports he does not like the food there and "the staff are stupid" per ED note. Also per ED note "Of note they state that they do usually have a psychiatrist there however psychiatrist has been on vacation therefore they sent him here for evaluation." Pt endorses intermittent dizziness upon standing. Denies  headache, chest pain, palpitations, SOB, abdominal pain, joint pain, diarrhea/constipation, urinary symptoms. Vitals: T 98.1, HR 94, /75, RR 17 satting 100% RA. Attempted to call [facility] myself for collateral but they at first couldnt see that the patient was even sent to the hospital and then they were unable to provide collateral information as they "did not know  the patient" and no specific documentation in his chart. Attempted to call Dr. Horn as that is the listed PCP and no answer    Patient was admitted for altered mental status. UA was positive and patient completed course of abx. Ucx grew coad neg staph. Psychiatry was consulted.     On 10/23/23 this case was reviewed with Dr. Horn, the patient is medically stable and optimized for discharge. All medications were reviewed and prescriptions were sent to mutually agreed upon pharmacy.            73 yr old male with a pmh of HTN, schizoaffective disorder, EPS, BPH, PVD who presents with change in his behavior over the past week. Pt himself reports that the NH "buys adult movies which make me masturbate". He also reports he does not like the food there and "the staff are stupid" per ED note. Also per ED note "Of note they state that they do usually have a psychiatrist there however psychiatrist has been on vacation therefore they sent him here for evaluation." Pt endorses intermittent dizziness upon standing. Denies  headache, chest pain, palpitations, SOB, abdominal pain, joint pain, diarrhea/constipation, urinary symptoms. Vitals: T 98.1, HR 94, /75, RR 17 satting 100% RA. Attempted to call [facility] myself for collateral but they at first couldnt see that the patient was even sent to the hospital and then they were unable to provide collateral information as they "did not know  the patient" and no specific documentation in his chart. Attempted to call Dr. Horn as that is the listed PCP and no answer    Patient was admitted for altered mental status. UA was positive and patient completed course of abx. Ucx grew coad neg staph. Psychiatry was consulted.     On 10/24/23 this case was reviewed with Dr. Horn, the patient is medically stable and optimized for discharge. All medications were reviewed and prescriptions were sent to mutually agreed upon pharmacy.

## 2023-10-23 NOTE — DISCHARGE NOTE PROVIDER - NSDCCPCAREPLAN_GEN_ALL_CORE_FT
PRINCIPAL DISCHARGE DIAGNOSIS  Diagnosis: Acute UTI  Assessment and Plan of Treatment: You were admitted for altered mental status. Urinlaysis was positive and you completed course of antibiotics. Psychiatry was consulted and patient was cleared for discharge. Please follow up with your primary care physician after discharge for continued monitoring and management.

## 2023-10-24 VITALS
DIASTOLIC BLOOD PRESSURE: 61 MMHG | SYSTOLIC BLOOD PRESSURE: 105 MMHG | HEART RATE: 66 BPM | OXYGEN SATURATION: 100 % | TEMPERATURE: 98 F

## 2023-10-24 LAB
ANION GAP SERPL CALC-SCNC: 11 MMOL/L — SIGNIFICANT CHANGE UP (ref 7–14)
ANION GAP SERPL CALC-SCNC: 11 MMOL/L — SIGNIFICANT CHANGE UP (ref 7–14)
BUN SERPL-MCNC: 16 MG/DL — SIGNIFICANT CHANGE UP (ref 7–23)
BUN SERPL-MCNC: 16 MG/DL — SIGNIFICANT CHANGE UP (ref 7–23)
CALCIUM SERPL-MCNC: 8.8 MG/DL — SIGNIFICANT CHANGE UP (ref 8.4–10.5)
CALCIUM SERPL-MCNC: 8.8 MG/DL — SIGNIFICANT CHANGE UP (ref 8.4–10.5)
CHLORIDE SERPL-SCNC: 107 MMOL/L — SIGNIFICANT CHANGE UP (ref 98–107)
CHLORIDE SERPL-SCNC: 107 MMOL/L — SIGNIFICANT CHANGE UP (ref 98–107)
CO2 SERPL-SCNC: 22 MMOL/L — SIGNIFICANT CHANGE UP (ref 22–31)
CO2 SERPL-SCNC: 22 MMOL/L — SIGNIFICANT CHANGE UP (ref 22–31)
CREAT SERPL-MCNC: 0.72 MG/DL — SIGNIFICANT CHANGE UP (ref 0.5–1.3)
CREAT SERPL-MCNC: 0.72 MG/DL — SIGNIFICANT CHANGE UP (ref 0.5–1.3)
EGFR: 97 ML/MIN/1.73M2 — SIGNIFICANT CHANGE UP
EGFR: 97 ML/MIN/1.73M2 — SIGNIFICANT CHANGE UP
GLUCOSE SERPL-MCNC: 82 MG/DL — SIGNIFICANT CHANGE UP (ref 70–99)
GLUCOSE SERPL-MCNC: 82 MG/DL — SIGNIFICANT CHANGE UP (ref 70–99)
HCT VFR BLD CALC: 35.7 % — LOW (ref 39–50)
HCT VFR BLD CALC: 35.7 % — LOW (ref 39–50)
HGB BLD-MCNC: 11.6 G/DL — LOW (ref 13–17)
HGB BLD-MCNC: 11.6 G/DL — LOW (ref 13–17)
MAGNESIUM SERPL-MCNC: 2.2 MG/DL — SIGNIFICANT CHANGE UP (ref 1.6–2.6)
MAGNESIUM SERPL-MCNC: 2.2 MG/DL — SIGNIFICANT CHANGE UP (ref 1.6–2.6)
MCHC RBC-ENTMCNC: 31.6 PG — SIGNIFICANT CHANGE UP (ref 27–34)
MCHC RBC-ENTMCNC: 31.6 PG — SIGNIFICANT CHANGE UP (ref 27–34)
MCHC RBC-ENTMCNC: 32.5 GM/DL — SIGNIFICANT CHANGE UP (ref 32–36)
MCHC RBC-ENTMCNC: 32.5 GM/DL — SIGNIFICANT CHANGE UP (ref 32–36)
MCV RBC AUTO: 97.3 FL — SIGNIFICANT CHANGE UP (ref 80–100)
MCV RBC AUTO: 97.3 FL — SIGNIFICANT CHANGE UP (ref 80–100)
NRBC # BLD: 0 /100 WBCS — SIGNIFICANT CHANGE UP (ref 0–0)
NRBC # BLD: 0 /100 WBCS — SIGNIFICANT CHANGE UP (ref 0–0)
NRBC # FLD: 0 K/UL — SIGNIFICANT CHANGE UP (ref 0–0)
NRBC # FLD: 0 K/UL — SIGNIFICANT CHANGE UP (ref 0–0)
PHOSPHATE SERPL-MCNC: 2.8 MG/DL — SIGNIFICANT CHANGE UP (ref 2.5–4.5)
PHOSPHATE SERPL-MCNC: 2.8 MG/DL — SIGNIFICANT CHANGE UP (ref 2.5–4.5)
PLATELET # BLD AUTO: 91 K/UL — LOW (ref 150–400)
PLATELET # BLD AUTO: 91 K/UL — LOW (ref 150–400)
POTASSIUM SERPL-MCNC: 4.1 MMOL/L — SIGNIFICANT CHANGE UP (ref 3.5–5.3)
POTASSIUM SERPL-MCNC: 4.1 MMOL/L — SIGNIFICANT CHANGE UP (ref 3.5–5.3)
POTASSIUM SERPL-SCNC: 4.1 MMOL/L — SIGNIFICANT CHANGE UP (ref 3.5–5.3)
POTASSIUM SERPL-SCNC: 4.1 MMOL/L — SIGNIFICANT CHANGE UP (ref 3.5–5.3)
RBC # BLD: 3.67 M/UL — LOW (ref 4.2–5.8)
RBC # BLD: 3.67 M/UL — LOW (ref 4.2–5.8)
RBC # FLD: 13.4 % — SIGNIFICANT CHANGE UP (ref 10.3–14.5)
RBC # FLD: 13.4 % — SIGNIFICANT CHANGE UP (ref 10.3–14.5)
SODIUM SERPL-SCNC: 140 MMOL/L — SIGNIFICANT CHANGE UP (ref 135–145)
SODIUM SERPL-SCNC: 140 MMOL/L — SIGNIFICANT CHANGE UP (ref 135–145)
WBC # BLD: 2.81 K/UL — LOW (ref 3.8–10.5)
WBC # BLD: 2.81 K/UL — LOW (ref 3.8–10.5)
WBC # FLD AUTO: 2.81 K/UL — LOW (ref 3.8–10.5)
WBC # FLD AUTO: 2.81 K/UL — LOW (ref 3.8–10.5)

## 2023-10-24 RX ADMIN — DIVALPROEX SODIUM 500 MILLIGRAM(S): 500 TABLET, DELAYED RELEASE ORAL at 05:43

## 2023-10-24 RX ADMIN — PANTOPRAZOLE SODIUM 40 MILLIGRAM(S): 20 TABLET, DELAYED RELEASE ORAL at 05:43

## 2023-10-24 RX ADMIN — Medication 0.5 MILLIGRAM(S): at 05:43

## 2023-10-24 NOTE — CHART NOTE - NSCHARTNOTEFT_GEN_A_CORE
Hospital Course:  Discharge Date	24-Oct-2023  	  Admission Date	18-Oct-2023 13:09  Reason for Admission	UTI, encephalopathy  Hospital Course	  73 yr old male with a pmh of HTN, schizoaffective disorder, EPS, BPH, PVD who presents with change in his behavior over the past week. Pt himself reports that the NH "buys adult movies which make me masturbate". He also reports he does not like the food there and "the staff are stupid" per ED note. Also per ED note "Of note they state that they do usually have a psychiatrist there however psychiatrist has been on vacation therefore they sent him here for evaluation." Pt endorses intermittent dizziness upon standing. Denies  headache, chest pain, palpitations, SOB, abdominal pain, joint pain, diarrhea/constipation, urinary symptoms. Vitals: T 98.1, HR 94, /75, RR 17 satting 100% RA. Attempted to call [facility] myself for collateral but they at first couldnt see that the patient was even sent to the hospital and then they were unable to provide collateral information as they "did not know  the patient" and no specific documentation in his chart.       Patient was admitted for altered mental status. UA was positive and patient completed course of abx. Ucx grew coad neg staph. Psychiatry was consulted.     On 10/24/23 this case was reviewed with Dr. Horn, the patient is medically stable and optimized for discharge. All medications were reviewed and prescriptions were sent to mutually agreed upon pharmacy.              Med Reconciliation:  Override IMPROVE-DD recommendations due to:	IMPROVE-DD Application Not Available  Recommended Post-Discharge VTE Prophylaxis	IMPROVE-DD Application Not Available  Medication Reconciliation Status	Admission Reconciliation is Completed  Discharge Reconciliation is Completed  Discharge Medications	benztropine 0.5 mg oral tablet: 1 tab(s) orally 2 times a day  divalproex sodium 500 mg oral delayed release tablet: 1 tab(s) orally 2 times a day  Flomax 0.4 mg oral capsule: 2 cap(s) orally once a day (at bedtime)  melatonin 1 mg oral tablet: 1 tab(s) orally once a day (at bedtime)  pantoprazole 40 mg oral delayed release tablet: 1 tab(s) orally once a day (before a meal)  Seroquel 400 mg oral tablet: 1 tab(s) orally once a day (at bedtime)  ,  ,   Care Plan/Procedures:  Discharge Diagnoses, Assessment and Plan of Treatment	PRINCIPAL DISCHARGE DIAGNOSIS  Diagnosis: Acute UTI  Assessment and Plan of Treatment: You were admitted for altered mental status. Urinlaysis was positive and you completed course of antibiotics. Psychiatry was consulted and patient was cleared for discharge. Please follow up with your primary care physician after discharge for continued monitoring and management.  Goal(s)	To get better and follow your care plan as instructed.   Follow Up:  C   Phone: (   )    -  Fax: (   )    -  Follow Up Time:  Follow-up Clinics	[facility]  Psychiatry  [address] [place] [address]  Phone: [phone]  Fax:  Additional Provider Info (For SysAdmin Use Only)	FREE:[LAST:[Your PCP],PHONE:[(   )    -],FAX:[(   )    -]]  Care Providers Direct Addresses (For SYSAdmin Use Only)	,DirectAddress_Unknown  Follow-up Clinics (For SysAdmin Use Only)	[id number]: || ||00\01||False;  NPI number (For SysAdmin Use Only) :	[UNKNOWN]  Discharge Diet	DASH Diet, Pureed Diet  Activity	No heavy lifting/straining   Quality Measures:  Patient Condition	Stable  Hospice Patient	No  Core Measure Site	No  Does the patient have difficulty running errands alone like visiting a doctor’s office or shopping?	Yes  Does the patient have difficulty climbing stairs?	Yes  Cognition: The patient has	Difficulty remembering  Does the patient have a principal diagnosis of ischemic stroke, hemorrhagic stroke, or TIA?	No  Does the patient have a principal diagnosis of Acute Myocardial Infarction?	No  Has the patient had a Percutaneous Coronary Intervention?	No  Did the Patient Present With or was Treated for Malnutrition During This Admission	No   Document Complete:  Care Provider Seen in [facility]	Ramona Horn Discussed with the  the floor and  the facility [facility] regarding transferring her belonging from [place] due to the new nursing home the phone number for Anne is [phone]  Physician Section Complete	This document is complete and the patient is ready for discharge.  For questions about your prescriptions, please call:	[phone]  Is this contact telephone number correct?	Yes  Attending Attestation Statement	I have personally seen and examined the patient. I have collaborated with and supervised the  .	on the discharge service for the patient. I have reviewed and made amendments to the documentation where necessary.       ,
Patient medically cleared for discharge to NH. Discussed with MD Horn.    Noa Yun NP  [id number]